# Patient Record
Sex: MALE | Race: WHITE
[De-identification: names, ages, dates, MRNs, and addresses within clinical notes are randomized per-mention and may not be internally consistent; named-entity substitution may affect disease eponyms.]

---

## 2020-07-30 ENCOUNTER — HOSPITAL ENCOUNTER (INPATIENT)
Dept: HOSPITAL 35 - SBH | Age: 75
LOS: 41 days | Discharge: NURSING, REHAB OR LONG-TERM CARE | DRG: 884 | End: 2020-09-09
Attending: PSYCHIATRY & NEUROLOGY | Admitting: PSYCHIATRY & NEUROLOGY
Payer: COMMERCIAL

## 2020-07-30 VITALS — HEIGHT: 74 IN | WEIGHT: 209.5 LBS | BODY MASS INDEX: 26.89 KG/M2

## 2020-07-30 DIAGNOSIS — R45.850: ICD-10-CM

## 2020-07-30 DIAGNOSIS — N18.3: ICD-10-CM

## 2020-07-30 DIAGNOSIS — G47.00: ICD-10-CM

## 2020-07-30 DIAGNOSIS — F01.51: Primary | ICD-10-CM

## 2020-07-30 DIAGNOSIS — G93.40: ICD-10-CM

## 2020-07-30 DIAGNOSIS — F32.9: ICD-10-CM

## 2020-07-30 DIAGNOSIS — G91.8: ICD-10-CM

## 2020-07-30 DIAGNOSIS — N17.0: ICD-10-CM

## 2020-07-30 DIAGNOSIS — E83.42: ICD-10-CM

## 2020-07-30 DIAGNOSIS — F41.9: ICD-10-CM

## 2020-07-30 DIAGNOSIS — N40.0: ICD-10-CM

## 2020-07-30 DIAGNOSIS — E11.42: ICD-10-CM

## 2020-07-30 DIAGNOSIS — F43.10: ICD-10-CM

## 2020-07-30 DIAGNOSIS — I95.9: ICD-10-CM

## 2020-07-30 DIAGNOSIS — E11.22: ICD-10-CM

## 2020-07-30 DIAGNOSIS — D63.1: ICD-10-CM

## 2020-07-30 DIAGNOSIS — I12.9: ICD-10-CM

## 2020-07-30 DIAGNOSIS — Z20.828: ICD-10-CM

## 2020-07-30 DIAGNOSIS — F29: ICD-10-CM

## 2020-07-30 DIAGNOSIS — R45.851: ICD-10-CM

## 2020-07-30 DIAGNOSIS — C91.41: ICD-10-CM

## 2020-07-30 DIAGNOSIS — E78.5: ICD-10-CM

## 2020-07-30 PROCEDURE — 10880: CPT

## 2020-07-30 NOTE — NUR
PT ARRIVED VIA EMS STRETCHER. PT WAS ABLE TO TRANSFER FROM CART TO BED WITH X1
ASSIST. GAVE WATER TO THE PATIENT, PT WAS SITTING ON SIDE OF BED, BED ALARM
ON. GAVE REPORT TO NIGHT SHIFT NURSE. [facility]. PT MENTAL ORIENTATION IS TO SELF AND PLACE AT [facility]. RECENTLY PER [facility]
HOSPITAL RECORD STATED HE USED TO LIVE ALONE, NOW ORIENTED TO SELF.

## 2020-07-31 VITALS — DIASTOLIC BLOOD PRESSURE: 42 MMHG | SYSTOLIC BLOOD PRESSURE: 86 MMHG

## 2020-07-31 VITALS — DIASTOLIC BLOOD PRESSURE: 44 MMHG | SYSTOLIC BLOOD PRESSURE: 75 MMHG

## 2020-07-31 LAB
ALBUMIN SERPL-MCNC: 3.9 G/DL (ref 3.4–5)
ALT SERPL-CCNC: 27 U/L (ref 30–65)
ANION GAP SERPL CALC-SCNC: 12 MMOL/L (ref 7–16)
ANION GAP SERPL CALC-SCNC: 12 MMOL/L (ref 7–16)
AST SERPL-CCNC: 17 U/L (ref 15–37)
BILIRUB SERPL-MCNC: 0.8 MG/DL (ref 0.2–1)
BUN SERPL-MCNC: 23 MG/DL (ref 7–18)
BUN SERPL-MCNC: 24 MG/DL (ref 7–18)
CALCIUM SERPL-MCNC: 8.8 MG/DL (ref 8.5–10.1)
CALCIUM SERPL-MCNC: 9 MG/DL (ref 8.5–10.1)
CHLORIDE SERPL-SCNC: 97 MMOL/L (ref 98–107)
CHLORIDE SERPL-SCNC: 97 MMOL/L (ref 98–107)
CO2 SERPL-SCNC: 24 MMOL/L (ref 21–32)
CO2 SERPL-SCNC: 24 MMOL/L (ref 21–32)
CREAT SERPL-MCNC: 4 MG/DL (ref 0.7–1.3)
CREAT SERPL-MCNC: 4.3 MG/DL (ref 0.7–1.3)
GLUCOSE SERPL-MCNC: 151 MG/DL (ref 74–106)
GLUCOSE SERPL-MCNC: 194 MG/DL (ref 74–106)
LIPASE: 63 U/L (ref 73–393)
POTASSIUM SERPL-SCNC: 3.7 MMOL/L (ref 3.5–5.1)
POTASSIUM SERPL-SCNC: 3.8 MMOL/L (ref 3.5–5.1)
PROT SERPL-MCNC: 7.4 G/DL (ref 6.4–8.2)
SODIUM SERPL-SCNC: 133 MMOL/L (ref 136–145)
SODIUM SERPL-SCNC: 133 MMOL/L (ref 136–145)
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
## 2020-07-31 NOTE — NUR
THIS WRITER CALLED PATIENT'S DPOA (MANDY CHAN) AT 1748HRS ON NUMBER
[phone] TO OBTAIN CONSENT OVER THE PHONE TO TREAT, MESSAGE LEFT ON HER
VOICE MAIL, AWAITNG CALL BACK.

## 2020-07-31 NOTE — NUR
SW spoke with family with Dr Chahal and completed the intake assessment and
TP. Pt needs IV fluids and will likelt d/c to the medical unit.

## 2020-08-01 VITALS — DIASTOLIC BLOOD PRESSURE: 76 MMHG | SYSTOLIC BLOOD PRESSURE: 128 MMHG

## 2020-08-01 VITALS — DIASTOLIC BLOOD PRESSURE: 52 MMHG | SYSTOLIC BLOOD PRESSURE: 105 MMHG

## 2020-08-01 LAB
ALBUMIN SERPL-MCNC: 3.3 G/DL (ref 3.4–5)
ANION GAP SERPL CALC-SCNC: 11 MMOL/L (ref 7–16)
BUN SERPL-MCNC: 26 MG/DL (ref 7–18)
CALCIUM SERPL-MCNC: 8.2 MG/DL (ref 8.5–10.1)
CHLORIDE SERPL-SCNC: 99 MMOL/L (ref 98–107)
CO2 SERPL-SCNC: 23 MMOL/L (ref 21–32)
CREAT SERPL-MCNC: 3.9 MG/DL (ref 0.7–1.3)
GLUCOSE SERPL-MCNC: 141 MG/DL (ref 74–106)
PHOSPHATE SERPL-MCNC: 3.2 MG/DL (ref 2.5–4.9)
POTASSIUM SERPL-SCNC: 3.7 MMOL/L (ref 3.5–5.1)
SODIUM SERPL-SCNC: 133 MMOL/L (ref 136–145)

## 2020-08-01 NOTE — NUR
Assumed care of pt @ 1900. Pt calm et cooperative this shift. Pt had IV fluids
running at beginning of shift that ran until approximately 0030. Order
clarified with APRN on call that pt is to have fluids every four hours until
seen by nephrologist. Fluids started again at 0300 to run at 250mL/hour for
four hours. Took medications shole without difficulty. B/P low this shift but
was notified by prior shift that he has been running low. All other VSWNL.
Health assessment with no abnormalities other than previously noted. Denies
SI/HI at present time. Needs assistance of two people to toilet as pt will
easily just collapse as if legs are giving out beneath him. Would benefit from
the use of a bedside commode rather than going into the bathroom to toilet.
Currently resting in recliner in dayroom with eyes closed. Fluids running
with no signs or symptoms of adverse reaction at present time. Will continue
to monitor per protocol.
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
## 2020-08-01 NOTE — NUR
Assumed care 0700.  Initially pt. was pleasant upon initial meeting.  The
second meeting around 0745 pt. was being seen by Ching Kemp NP-Psych then
the Kidney specialist who asked pt. did he know he had kidney problems and
when did they start.  Pt. became angry, acknowledged he had kidney problems
then started saying inappropriate things to the doctor like I m going to blow
you up.  Pt. was not consoled when nurse tried to explain doctor wanted to
know from pt. perspective the course of his illness.  Doctor left the pt.,
spoke with Dr. Chahal and declined to accept patient on his service.  Dr. Chahal called unit, spoke with nurse and said he would call Dr. Gupat and
said he would discuss situation with Dr. Gupta and ask him to call nurse which
was done.  Dr. Gupta wanted IV discontinued (another bag was hung at
approximately 0800 as night nurse stated in report medical NP said it was to
continue every 4 hours).  IV was DC'd 0820.  Catheter is to be left in until
further notice.
as night
nurse reported that IV was to continue every four hours)

## 2020-08-01 NOTE — NUR
Dr. Gupta DC'D IV 0.9% Sodium Chloride as of 0820.  0845 catheter mycwcl=812
cc, 8667=880 cc, 0595=761wv pale yellow urine.  He eventually had a BM.  It
took two times offering him his AMmeds to get them completed.  For the rest of
the day he was pleasant/polite to rebekahe nurse.  He was also delusional talking
about guns coming into this facility and afraid for the staff.  He had gotten
out of bed unassisted without bed alarm.  Pt. wa requested to get/call for
staff.  He was associating with a female peer who was also watching the news
and talking/commiserating very delusionally about the news. He offers no c/o's
of pain, denies SI/HI/hallucinations.

## 2020-08-02 VITALS — DIASTOLIC BLOOD PRESSURE: 77 MMHG | SYSTOLIC BLOOD PRESSURE: 135 MMHG

## 2020-08-02 VITALS — SYSTOLIC BLOOD PRESSURE: 117 MMHG | DIASTOLIC BLOOD PRESSURE: 79 MMHG

## 2020-08-02 LAB
ALBUMIN SERPL-MCNC: 3.2 G/DL (ref 3.4–5)
ANION GAP SERPL CALC-SCNC: 10 MMOL/L (ref 7–16)
BUN SERPL-MCNC: 20 MG/DL (ref 7–18)
CALCIUM SERPL-MCNC: 8.5 MG/DL (ref 8.5–10.1)
CHLORIDE SERPL-SCNC: 99 MMOL/L (ref 98–107)
CO2 SERPL-SCNC: 26 MMOL/L (ref 21–32)
CREAT SERPL-MCNC: 1.9 MG/DL (ref 0.7–1.3)
GLUCOSE SERPL-MCNC: 158 MG/DL (ref 74–106)
PHOSPHATE SERPL-MCNC: 2.9 MG/DL (ref 2.5–4.9)
POTASSIUM SERPL-SCNC: 3.8 MMOL/L (ref 3.5–5.1)
SODIUM SERPL-SCNC: 135 MMOL/L (ref 136–145)

## 2020-08-02 NOTE — NUR
Assisted to/from toilet, having  small BM.  Denies SI/HI.  RE: hallucinations
is positive for visual and auditory hallucinations.  He believes he gets
messages from the water sprinkler system in his room such that he can predict
the future and is pretty accurate at doing that and well respected for it.  He
is proud of this.  Says he has the same birthdate as Daniel and has followers
and speaks the truth.  He says this in a very calm, assured manner.  He made
the comment (delusionally) that he thought this nurse/writer was the one who
came up with the Covid-19 vaccine.  That comment was corrected with patient
being told that this nurse is a nurse with patients on this floor and is not a
research .

## 2020-08-02 NOTE — NUR
Assumed care of pt @ 1900. Pt calm et cooperative with pleasant demeanor this
shift. Took medications whole without difficulty. Ambulates short distances
with assistance of staff et gait belt. Mclaughlin catheter to dependent drainage
with clear yellow urine of 1100mL this shift @ 0145. VSWNL. Health assessment
with no abnormalities noted at present time. Denies SI/HI @ present time.
Isolated in room most of shift but came out to watch television around 0200
until 0330. Currently resting in bed with eyes closed. Will continue to
monitor per protocol.

## 2020-08-02 NOTE — NUR
Patient did not want to go out to the dayroom because he believes the others
there don't think he knows anything and that depresses him.  He has been med
compliant today.

## 2020-08-03 VITALS — SYSTOLIC BLOOD PRESSURE: 145 MMHG | DIASTOLIC BLOOD PRESSURE: 78 MMHG

## 2020-08-03 VITALS — DIASTOLIC BLOOD PRESSURE: 78 MMHG | SYSTOLIC BLOOD PRESSURE: 145 MMHG

## 2020-08-03 VITALS — SYSTOLIC BLOOD PRESSURE: 120 MMHG | DIASTOLIC BLOOD PRESSURE: 68 MMHG

## 2020-08-03 VITALS — SYSTOLIC BLOOD PRESSURE: 140 MMHG | DIASTOLIC BLOOD PRESSURE: 68 MMHG

## 2020-08-03 LAB
ALBUMIN SERPL-MCNC: 3.3 G/DL (ref 3.4–5)
ANION GAP SERPL CALC-SCNC: 9 MMOL/L (ref 7–16)
BUN SERPL-MCNC: 17 MG/DL (ref 7–18)
CALCIUM SERPL-MCNC: 8.7 MG/DL (ref 8.5–10.1)
CHLORIDE SERPL-SCNC: 100 MMOL/L (ref 98–107)
CO2 SERPL-SCNC: 28 MMOL/L (ref 21–32)
CREAT SERPL-MCNC: 1.5 MG/DL (ref 0.7–1.3)
GLUCOSE SERPL-MCNC: 140 MG/DL (ref 74–106)
PHOSPHATE SERPL-MCNC: 3.1 MG/DL (ref 2.5–4.9)
POTASSIUM SERPL-SCNC: 3.3 MMOL/L (ref 3.5–5.1)
SODIUM SERPL-SCNC: 137 MMOL/L (ref 136–145)

## 2020-08-03 NOTE — NUR
ASSUMED CARE AT 0700 THIS MORNING.  PT. HAS REMAINED IN BED EXCEPT MEAL TIME.
HE WAS COOPERATIVE WITH MEDICATIONS UNTIL 1700 WHEN REFUSES FINGER STICKS FOR
BLOOD SUGAR (EVEN AFTER BEING ASKED X'S 3 BY STAFF AND PCA'S).  TALKED TO DR. THOMPSON WHO INFORMED THIS RN TO NOT PERSUE IT AT THIS TIME.  PT. WAS BLOWING
RASBERRIES AT THIS RN WHEN SHE APPROACHED HIM.  HE IS IRRITABLE AT THIS TIME,
AND UNCOOPERATIVE WITH ALL STAFF.

## 2020-08-03 NOTE — NUR
IVAN spoke with Zafar LOVE at  [phone] [phone], and she spoke with Dr Chahal too. Zafar reported that this pt would be best to return to [facility] and then transition to LTC. [facility] is [facility]missions into [facility] from the [facility]. IVAN then called [facility] and left a VM for Domingo in admissions about this possible readmission.
Then faxed updates.

## 2020-08-04 VITALS — DIASTOLIC BLOOD PRESSURE: 73 MMHG | SYSTOLIC BLOOD PRESSURE: 123 MMHG

## 2020-08-04 VITALS — DIASTOLIC BLOOD PRESSURE: 65 MMHG | SYSTOLIC BLOOD PRESSURE: 113 MMHG

## 2020-08-04 NOTE — NUR
Pt has been accpeted at [facility] clinically and will admit when pt is
ready. Sw will continue to assist with the medi assist to complete the
medicaid application

## 2020-08-04 NOTE — NUR
Kym spoke with panfilo at [facility] and they have denied this pt to return as they
cannot meet his needs. KYM then called Z[facility]ar LOVE at [facility] and left a VM regarding
this decision and requested assistance with placement.

## 2020-08-04 NOTE — NUR
Assumed care of patient this pm shift. Patient calm resting in his room.
Patient is delusional to some degree and states that he no longer needs to
take any medications as he is "beyond medications." Patient refused his pm
medications. Patient dresses in many layers and had on three scrub bottoms
with a pair of blue hossein pants. Patient denies pain. Patient denies hi/si.
Patients affect is blunted. Patient is alert and oriented to self only.
Patient asked RN what city he was in and RN replied [facility].
Patients assessment shows no signs of acute distress. Patient also got on the
floor and when asked why, he stated that he needed to stretch his legs.
Patient was encouraged to not sit on the floor but rather to stretch his legs
in bed. We will continue to monitor per hospital protocol.

## 2020-08-04 NOTE — NUR
Alert to name only, confused speech.  Was very resistant to care this AM
refusing VS, accucheck, assessment and to answering questions.  Talked with
him for approximately 10 min with him being calm but refusing everything
including to get out of bed.  Left room to give meds and he ambulated
independently out of room and sat down at breakfast table where he proceeded
to be calm, cooperative and pleasant taking all meds, compliant with VS.  BG
done late d/t refusal, 3 units insulin given per sliding scale after he was
compliant.  States he had a difficult night d/t it being noisy and he wasn't
comfortable with environment.  Compliant with assessment at approximately
1030.  Breath sounds clear.  Reg HR auscultated.  Color pink with brisk
capillary refill and palpable peripheral pulses.  Independent with voiding.
Bladder scan done at approximately 1130 was 273.  Active bowel sounds over
soft, rounded abdomen.  States he had BM this AM, smear of stool around
rectum.  Ambulates with regular, steady gait independently.  States he is weak
at times.  Rested in bed after group.  Currently eating lunch without s/o
distress.

## 2020-08-05 VITALS — DIASTOLIC BLOOD PRESSURE: 62 MMHG | SYSTOLIC BLOOD PRESSURE: 101 MMHG

## 2020-08-05 VITALS — SYSTOLIC BLOOD PRESSURE: 101 MMHG | DIASTOLIC BLOOD PRESSURE: 62 MMHG

## 2020-08-05 VITALS — DIASTOLIC BLOOD PRESSURE: 54 MMHG | SYSTOLIC BLOOD PRESSURE: 115 MMHG

## 2020-08-05 NOTE — NUR
Assumed care of patient this pm shift. Patient is more aggreable this evening
and in good spirits. Patient took medications as prescribed with thin fluids.
Patient is alert and oriented x2. Patient denies pain. Patient denies hi/si.
Patients affect is blunted. Patient is a falls risk and has on a yellow shirt
under his red shirt. Patient wears multiple layers of clothes and requested
wearing his shoes to bed. Patients assessment shows no signs of acute
distress. Patient does not appear to be having any audible or visual
hallucinations. Patient is continent of bowel and bladder. Patient did not
have any concerns this evening. We will continue to monitor patient per
hospital policy.

## 2020-08-05 NOTE — NUR
ASSUMED CARE AT 0700 THIS MORNING.  PT. WAS SITTING ON THE UNIT IN A CHAIR
WITH HIS BACK TO THE DINNING ROOM AND FACING THE HALLWAY.  HE WAS ASKED TO
MOVE WHEN THE BREAKFAST CART CAME SO THEY COULD PUT THE CART IN THE ROOM.  PT.
WAS COOPERATIVE WITH THIS.  AFTER BREAKFAST, HE AGAIN PULLED HIS CHAIR INTO
THE DOORWAY WITH HIS BACK TO THE DINNING ROOM AND FACING THE CHWODHURY.  HE WAS
PLEASANT AND COOPERATIVE WITH HIS MEDICATIONS AND BLOOD SUGARS.  HE IS EATING
WELL.  HE CONTINUES TO STATE HE IS ALRIGHT BUT IS SUSPICIOUS OF OTHERS.

## 2020-08-06 VITALS — DIASTOLIC BLOOD PRESSURE: 67 MMHG | SYSTOLIC BLOOD PRESSURE: 118 MMHG

## 2020-08-06 VITALS — SYSTOLIC BLOOD PRESSURE: 118 MMHG | DIASTOLIC BLOOD PRESSURE: 67 MMHG

## 2020-08-06 VITALS — SYSTOLIC BLOOD PRESSURE: 109 MMHG | DIASTOLIC BLOOD PRESSURE: 50 MMHG

## 2020-08-06 NOTE — NUR
PT WALKING TO ROOM FOR GROUP AND LEAN AGAINS WALL. PT NEEDED A W/C TO TRANSFER
AT THIS TIME TO GO TO GROUP. PT HAS PERIODS OF WALKING BY SELF, USING WALKER,
AND IN W/C.

## 2020-08-06 NOTE — NUR
Due to a covid outbreak at [facility] they cannot accept any pts for
the next few weeks. They advised that this pt try their sister community in
[place]. Sw sent the referral there.

## 2020-08-06 NOTE — NUR
Pt alert and oriented to self. Forgetful. Confused. Pt was in his bed at time
of assessment. Pt expressed having generalized pain of 5/10. Pt voiced that he
always have pain all over. This he did not voice yesterday. Pt given tylenol
in addition to night meds. Pt voiced having some anxiety and depression. Pt
currently in his bed sleeping. Will continue to monitor.

## 2020-08-06 NOTE — NUR
Subjective   Jimmie Salcedo reports: No big changes since last visit. Patient is performing putty exercises at home daily.    Objective   SCARRING: hypertrophic scarring present with hypersensitivity  OBSERVATION: significant flexion contracture still present  AROM: PIP flexion approximately 12 degrees  PROM: PIP flexion approximately 80 degrees  See Exercise, Manual, and Modality Logs for complete treatment.       Assessment/Plan  Finger flexor contracture still present. Continue with aggressive manual therapy to loosen contracture site.  Progress per Plan of Care           Manual Therapy:    35     mins  56223;  Therapeutic Exercise:    5     mins  34902;     Neuromuscular Amy:        mins  59394;    Therapeutic Activity:          mins  33828;     Gait Training:           mins  [id number];     Ultrasound:     13     mins  08912;   Iontophoresis          mins  85024   Electrical Stimulation:         mins  78361 ( );  Dry Needling          mins self-pay  Fluidotherapy     15     mins [id number]    Timed Treatment:   53   mins   Total Treatment:     68   mins    Aren Randle, PT, CHT  Physical Therapist   PT SITTING IN DINING ROOM AFTER BREAKFAST. PT TOOK MEDS WITHOUT ANY ISSUES. PT
IS WALKING THIS AM WITHOUT WALKER. PT DENIES ANY PAIN. PT IS COMPLIANT AND
CURTIOUS WITH STAFF, SAYING THANK YOU. PT SEEMS TO BE IN GOOD SPIRITS.

## 2020-08-06 NOTE — NUR
SW sent referrals to [facility], [facility], [facility]
and [facility] per [facility] request, as these are the only 4 nursing
homes that are secure and contract with [facility].

## 2020-08-06 NOTE — NUR
Pt was alert and oriented to self only this shift. Thought he was at . Pt
was in his bed at time of assessment. Pt was calm and cooperative. Pt stated
having some anxiety and depression. Pt denies pain. Meds given as ordered. Pt
had a bm this shift. Pt not always complaint in ambulating with a walker. Pt
currently in bed, sleeping. Will continue to monitor.

## 2020-08-07 VITALS — DIASTOLIC BLOOD PRESSURE: 49 MMHG | SYSTOLIC BLOOD PRESSURE: 115 MMHG

## 2020-08-07 VITALS — SYSTOLIC BLOOD PRESSURE: 154 MMHG | DIASTOLIC BLOOD PRESSURE: 68 MMHG

## 2020-08-07 LAB
ALBUMIN SERPL-MCNC: 3.1 G/DL (ref 3.4–5)
ANION GAP SERPL CALC-SCNC: 10 MMOL/L (ref 7–16)
BUN SERPL-MCNC: 11 MG/DL (ref 7–18)
CALCIUM SERPL-MCNC: 8.2 MG/DL (ref 8.5–10.1)
CHLORIDE SERPL-SCNC: 103 MMOL/L (ref 98–107)
CO2 SERPL-SCNC: 26 MMOL/L (ref 21–32)
CREAT SERPL-MCNC: 1.3 MG/DL (ref 0.7–1.3)
GLUCOSE SERPL-MCNC: 197 MG/DL (ref 74–106)
PHOSPHATE SERPL-MCNC: 3 MG/DL (ref 2.5–4.9)
POTASSIUM SERPL-SCNC: 3.8 MMOL/L (ref 3.5–5.1)
SODIUM SERPL-SCNC: 139 MMOL/L (ref 136–145)

## 2020-08-07 NOTE — NUR
PT EATING BREAKFAST IN DINING ROOM. PT USUALLY GOES BACK TO ROOM AFTER EATING.
PT LIKES ALL HIS RAILS UP ON HIS BED. PT DENIES ANY PAIN AT THIS TIME. PT
USING WALKER TO AMBULATE. PT IS A STAND-BY ASSIST. OFFERED FOR PT TO GET A
SHOWER TODAY. PT STATED THAT WOULD BE OK. PT VERY CORGAL AND THANKING STAFF
FOR CARE. NO SIGNS OF AGGITATION.

## 2020-08-07 NOTE — NUR
SW called [facility] and they do not have any male beds, SW called [facility] have no male beds, Sw called [facility] and they denied , [facility] denied earlier, [facility] has a COVID outbreak and
[facility] [place] does not have a secure unit. IVAN called Zafar LOVE at [phone]
[phone] to report that all of the snf denied and she will contact the
LTC coordinator Brandi and Zafar does not have her phone number to give to me.
Zafar also stated that

## 2020-08-07 NOTE — NUR
RT Progress Note- Viral "Nlio" is fairly active in the milieu and is present in
groups with encouragement. He is able to follow along in various music or
trivia games, but easily takes conversation away from the topic with a flight
of various thoughts such as thanking other patients for "saving so many lives"
or presenting conspiracy theories about COVID. Nilo is unsteady on his feet at
times and is assisted to and from groups as to prevent falls. He has not
displayed any aggressive or agitated outbursts during RT interactions.

## 2020-08-08 VITALS — SYSTOLIC BLOOD PRESSURE: 135 MMHG | DIASTOLIC BLOOD PRESSURE: 81 MMHG

## 2020-08-08 VITALS — DIASTOLIC BLOOD PRESSURE: 82 MMHG | SYSTOLIC BLOOD PRESSURE: 157 MMHG

## 2020-08-08 VITALS — DIASTOLIC BLOOD PRESSURE: 81 MMHG | SYSTOLIC BLOOD PRESSURE: 135 MMHG

## 2020-08-08 NOTE — NUR
Assumed care of patient this pm shift. Patient very tired, laying down during
assessment. Patient denies pain. Patient denies hi/si. Patient is alert and
oriented to person, place, and time. Patient takes medications whole with thin
fluids and has been adherent with his scheduled medications this evening.
Patients affect is flat. Patient is a falls risk and is wearing a yellow
shirt. Patient is ambulatory and uses a walker to ambulate. Patients
assessment shows no signs of acute distress. Patient needs encouragement to
shower. We will continue to monitor patient per hospital protocol.

## 2020-08-08 NOTE — NUR
ASSUMED CARE AT 0700 THIS MORNING.  PT. UP FOR MEALS IN THE DINING ROOM.  HE
RESTS IN HIS ROOM BETWEEN MEALS.  HE WAS PLEASANT AND COOPERATIVE WITH TAKING
HIS MEDICATIONS, TAKING HIS INSULIN (3 UNITS), AND WITH ASSESSMENT.  NO NEW
PROBLEMS NOTED OR VOICED.  HE PRESENTS WITH A DEPRESSED MOOD.

## 2020-08-09 VITALS — SYSTOLIC BLOOD PRESSURE: 121 MMHG | DIASTOLIC BLOOD PRESSURE: 56 MMHG

## 2020-08-09 VITALS — DIASTOLIC BLOOD PRESSURE: 87 MMHG | SYSTOLIC BLOOD PRESSURE: 165 MMHG

## 2020-08-09 VITALS — SYSTOLIC BLOOD PRESSURE: 140 MMHG | DIASTOLIC BLOOD PRESSURE: 82 MMHG

## 2020-08-09 LAB
ALBUMIN SERPL-MCNC: 3.1 G/DL (ref 3.4–5)
ALT SERPL-CCNC: 20 U/L (ref 30–65)
ANION GAP SERPL CALC-SCNC: 8 MMOL/L (ref 7–16)
AST SERPL-CCNC: 13 U/L (ref 15–37)
BASOPHILS NFR BLD AUTO: 0.5 % (ref 0–2)
BILIRUB SERPL-MCNC: 0.5 MG/DL (ref 0.2–1)
BUN SERPL-MCNC: 11 MG/DL (ref 7–18)
CALCIUM SERPL-MCNC: 8.9 MG/DL (ref 8.5–10.1)
CHLORIDE SERPL-SCNC: 103 MMOL/L (ref 98–107)
CO2 SERPL-SCNC: 27 MMOL/L (ref 21–32)
CREAT SERPL-MCNC: 1.3 MG/DL (ref 0.7–1.3)
EOSINOPHIL NFR BLD: 1.8 % (ref 0–3)
ERYTHROCYTE [DISTWIDTH] IN BLOOD BY AUTOMATED COUNT: 14.6 % (ref 10.5–14.5)
GLUCOSE SERPL-MCNC: 199 MG/DL (ref 74–106)
GRANULOCYTES NFR BLD MANUAL: 66.5 % (ref 36–66)
HCT VFR BLD CALC: 31.7 % (ref 42–52)
HGB BLD-MCNC: 11.5 GM/DL (ref 14–18)
LYMPHOCYTES NFR BLD AUTO: 22 % (ref 24–44)
MAGNESIUM SERPL-MCNC: 1.3 MG/DL (ref 1.8–2.4)
MCH RBC QN AUTO: 31.1 PG (ref 26–34)
MCHC RBC AUTO-ENTMCNC: 36.4 G/DL (ref 28–37)
MCV RBC: 85.3 FL (ref 80–100)
MONOCYTES NFR BLD: 9.2 % (ref 1–8)
NEUTROPHILS # BLD: 2.5 THOU/UL (ref 1.4–8.2)
PHOSPHATE SERPL-MCNC: 2.8 MG/DL (ref 2.5–4.9)
PLATELET # BLD: 122 THOU/UL (ref 150–400)
POTASSIUM SERPL-SCNC: 3.9 MMOL/L (ref 3.5–5.1)
PROT SERPL-MCNC: 6.7 G/DL (ref 6.4–8.2)
RBC # BLD AUTO: 3.71 MIL/UL (ref 4.5–6)
SODIUM SERPL-SCNC: 138 MMOL/L (ref 136–145)
TSH SERPL-ACNC: 0.49 UIU/ML (ref 0.36–3.74)
VIT B12 SERPL-MCNC: 471 PG/ML (ref 193–986)
WBC # BLD AUTO: 3.8 THOU/UL (ref 4–11)

## 2020-08-09 NOTE — NUR
PATIENT WAS UP IN DINING ROOM TONIGHT FOR HS SNACK AND BACK TO BED. HE TOOK
HIS MEDS WHOLE WITH WATER. HE WAS GIVEN LISPRO INSULIN 4U AT HS FOR GLUCOSE OF
231. PATIENT DENIES PAIN/SI/HI/AVH TONIGHT. HE IS UP WITH ASSIST TO USE THE
RESTROOM. BED ALARM ON AND BED IN LOW POSITION AND LOCKED. ROUTINE ROUNDS TO
ASSESS SAFETY AND STATUS OF PATIENT.

## 2020-08-09 NOTE — NUR
WITHDRAWN TO ROOM MAJORITY OF SHIFT-DID COME OUT FOR MEALS AND SNACK WITH
PROMPTING AND DID READ NEWSPAPER WHEN OFFERED/ BLUNTED AFFECT-DELAYED VERBAL
RESPONSES AND IS SLIGHTLY GUARDED DURING ATTEMPTED 1;1-OFFERS 1-2 WORD
RESPONSES. APPEARS UNKEMPT HAIR UNCOMBED,CLOTHING RUMPLED AS IF HAD BEEN SLEPT
IN-. WHEN ASKED IF HE WOULD LIKE ASSIST WITH ADL'S STATES VAGUELY "MAYBE
LATER" DENIES SI/SH/HI. DESCRIBES MOOD AS "ABOUT HE SAME" GAIT IS STEADY WITH
USE OF ROLLER WALKER. NO NOTED INTERACTION WITH PEER GROUP. BLOOD SUGAR AT
0700-167-3U INSULIN PER SS-110 -6 UNITS PER S.S. AM BP PRIOR TO BP MEDS
165/87-RECHECK AT 1130 BP /82. DENIES COMPLAINTS OF PAIN. COMPLIENT WITH
TAKING MEDICATIONS.

## 2020-08-10 VITALS — SYSTOLIC BLOOD PRESSURE: 113 MMHG | DIASTOLIC BLOOD PRESSURE: 47 MMHG

## 2020-08-10 VITALS — SYSTOLIC BLOOD PRESSURE: 146 MMHG | DIASTOLIC BLOOD PRESSURE: 66 MMHG

## 2020-08-10 LAB
EST. AVERAGE GLUCOSE BLD GHB EST-MCNC: 183 MG/DL
GLYCOHEMOGLOBIN (HGB A1C): 8 % (ref 4.8–5.6)

## 2020-08-10 NOTE — NUR
PATIENT IN ROOM ALL NIGHT. HE IS INDEPENDENT AND UP TO BATHROOM TONIGHT WITH
STAND BY ASSIST. PATIENT LOOKS DISHEVLEVED AND VERY SAD AND FORLORN. HE IS
ISOLATING HIMSELF BUT DOES MAKE IT TO ALL MEALS AND EATING WELL. HE DOES NOT
TALK ALOT AND WHEN ASKED QUESTIONS HE GIVES SHORT ANSWERS. HE DENIES
SI/HI/AVH. HE STATES HIS DAY WAS "OKAY," TODAY. PATIENT HOARDS WATER CUPS IN
HIS ROOM ON BEDSIDE TABLE AND HIS EXTRA CLOTHES AND BELONGINGS ARE KEPT AT THE
BOTTOM OF HIS BED. PATIENT IS UP WITH WALKER. PATIENT DID HAVE TYLENOL 650MG
FOR GENERALIZED PAIN IN BACK. HE FELL ASLEEP QUICKLY WITH HS MEDS AND IS
EYES CLOSED AT THIS TIME. PATIENT IS HYPERVILIGENT WHEN SOMEONE WALKS INTO
ROOM AND AWAKES QUICKLY TO SEE WHO IT IS. PATIENT IS WITHDRAWN. PATIENT HAS
FLAT AFFECT. BED IN LOW POSITON AND BED ALARM IS ON. ROUTINE ROUNDS TO ASSESS
SAFETY AND STATUS OF PATIENT.

## 2020-08-10 NOTE — NUR
WITHDRAWN TO ROOM DURING ANY UNSTRUCTURED TIME THIS AM-DID ATEND AM RT GROUP
AFTER BREAKFAST WITH PROMPTING TO COME BACK TO DAYROOM. FLAT AFFECT-MINIMALLY
VERBAL BUT DENIES COMPLAINTS DURING AM ASSESSMENT. GAIT STEADY WITH USE OF
ROLLER WALKER-DENIES DISTURBANCE OF SLEEP OR APPETITE. DENIES ACUTE ANXIETY OR
A/V HALLUCINATIONS. NO NOTED OR REPORTED PSYCHOSIS. DOES APPEAR MILDLY
HYPERVIGILANT IN MILLEU CONSISTANT WITH PTSD DX.DID APPEAR CONFUSED AT
BREAKFAST ASKING FOR THE DATE AND THEN WHAT TIME OF DAY IT WAS.

## 2020-08-10 NOTE — NUR
Assess for length of stay. 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 Hx
diabetes.  A1C 8% and -274.  Eating 100% of carb control diet order and
calorie level is appropriate.  On diabetic medications.  Also on extra
vitamin/mineral supplementation of vitamin D, B12, hiamine, folic acid.
Low nutrition risk.  Continue to progress to a goal of improved BG management.

## 2020-08-11 VITALS — SYSTOLIC BLOOD PRESSURE: 131 MMHG | DIASTOLIC BLOOD PRESSURE: 65 MMHG

## 2020-08-11 VITALS — DIASTOLIC BLOOD PRESSURE: 71 MMHG | SYSTOLIC BLOOD PRESSURE: 135 MMHG

## 2020-08-11 NOTE — NUR
PATIENT STAYED IN ROOM [facility]. HE IS A/0 X 3 BUT FORGETFUL. HE DOES GET
CONFUSED ABOUT TIME OF DAY SOMETIMES. TONIGHT HE REFUSED TO TAKE HIS HS MEDS.
HE DID ALLOW ME TO GIVE HIM HIS INSULIN  RESULTS OF ACCUCHECK. PATIENT
STATES HE IS NOT TAKING THEM BECAUSE HE IS "DONE FOR." I ASKED HIM IF HE WAS
THINKING ABOUT KILLING OR HARMING HIMSELF. HE SAID HE IS NOT SUICIDAL BUT HE
FEELS THAT EVERYTHING THAT CAN BE DONE FOR HIM IS DONE. HE STATES HE HAS NO
FAMILY, NO FRIENDS, NO GOALS AND DOESN'T FEEL HIS HEALTH WILL IMPROVE. HE JUST
WANTS TO BE LEFT ALONE AND LIVE OUT HIS DAYS TILL HE DIES. HE STATES HE HAS NO
THOUGHTS OF HARMING OR KILLING HIMSELF. HE SAID "I JUST WANT TO GIVE UP."
SPOKE 1:1 WITH PATIENT AND TRIED TO ENCOURAGE HIM WITH POSSIBLE GOALS AND
THINGS HE HAD TO LIVE FOR. PATIENT WAS PLEASANT AND MORE TALKATIVE TONIGHT. HE
HAS CHRONIC PAIN OVER HIS FLANK AREA THAT BOTHERS HIM OFF AND ON. HE REFUSED
ANY PRN MEDS. NOTIFIED DR LOZANO ON CALL OF MED REFUSAL AND PATIENT STATUS. HE
IS BEING MONITORED RAGHU. BED ALARM ON AND BED IN LOW POSITION. PATIENT IS
UP WITH WALKER. CONTINUING TO MONITOR.

## 2020-08-11 NOTE — NUR
Alert and orientated to person, place and situation.  Some delusional thought
stating he was diagnosed with criminal insanity.  Denies SI/HI.  Calm and
cooperative.  States year is 2225.  Initially refused all meds but then agreed
to take all with the exception of vitamins.  Breath sounds clear t/o.  Reg HR
auscultated.  Color pink with brisk capillary refill and palpable peripheral
pulses.  Independent with voiding.  Active bowel sounds over soft, rounded
abdomen.  Ambulates with walker with regular, steady gait.  No concerns today.
BM this am.

## 2020-08-12 VITALS — SYSTOLIC BLOOD PRESSURE: 104 MMHG | DIASTOLIC BLOOD PRESSURE: 64 MMHG

## 2020-08-12 VITALS — DIASTOLIC BLOOD PRESSURE: 70 MMHG | SYSTOLIC BLOOD PRESSURE: 150 MMHG

## 2020-08-12 VITALS — SYSTOLIC BLOOD PRESSURE: 175 MMHG | DIASTOLIC BLOOD PRESSURE: 83 MMHG

## 2020-08-12 VITALS — DIASTOLIC BLOOD PRESSURE: 83 MMHG | SYSTOLIC BLOOD PRESSURE: 175 MMHG

## 2020-08-12 VITALS — DIASTOLIC BLOOD PRESSURE: 64 MMHG | SYSTOLIC BLOOD PRESSURE: 104 MMHG

## 2020-08-12 NOTE — NUR
PATIENT WAS UP, AND OUT ON THE UNIT, SITTING IN DAYROOM WHEN CARE ASSUMED.
PATIENT CHOOSE NOT TO TAKE ALL MORNING MEDIACTIONS EXCEPT INSULIN. "I DON'T
WANT ANY OF THOSE, YOU CAN GIVE ME INSULIN. I AM NOT TAKING THOSE MEDICINE".
PATIENT IS EATING MEALS, AND DRINKING FLUID WELL. PATIENT AMBULATES WITH
ASSIST OF ROLLER WALKER, GAIT SLIGHTLY UNSTEADY.  PATIENT DENIES
SUICIDAL/HOMICIDAL IDEATION. HE DENIES DEPRESSION/ANXIETY. AFFECT IS
SAD/ANGRY/ BLUNTED. MOOD IS DEPRESSED, DR. THOMPSON NOTIFIED THAT PATIENT
REFUSED ALL MORNING MEDICATION, NO NEW ORDER NOTED AT THIS TIME. NO SIGN OF
ACUTE DISTRESS NOTED AT THIS TIME, WILL CONTINUE TO ENCOURAGE
MEDICATION COMPLIANT, AND MONITOR FOR SAFETY.

## 2020-08-12 NOTE — NUR
8-11-20 CARE TRANSFERED 1915 OBSERVED PT LEFT SIDE LYING IN BED RESTING WITH
EYES CLOSED. 2005 PT AAOX3, VSS, RR EVEN AND NONLABORED RA, ASSISTED PT TO
BATHROOM, ON STANDBY, NOTED MODERATE FIRM BROWN BM WITH YELLOW URINE IN TOLET.
PT DENIES ANY PAIN AND SI/SH/HI AT THIS TIME, PT REPORTS HE SOME TIMES
SEE/HEAR THINGS HE IS NOT SURE ABOUT, PT DENIES ANY VAH TODAY. PT REMAINED
CALM AND COOPERATIVE THROUGHING NURSING ASSESSMENT. DURING MEDICATION ADMIN.
PT TOOK INSULIN, BUT REFUSED ALL OTHER MEDICATION, WILL BRING THIS TO THE
ATTENTION OF ONCOMING RN DURING SHIFT REPORT. LATER ASSISTED PT WITH BED
ADJUSTMENT TO COMFORT, AND NOTED DURING NEXT ROUND THAT PT WAS SUPINE RESTING
WITH EYES CLOSED. ZERO ACUTE DISTRES NOTED, PT WILL CONTINUE TO BE MONITOR PER
[facility] PROTOCOL.

## 2020-08-13 VITALS — DIASTOLIC BLOOD PRESSURE: 65 MMHG | SYSTOLIC BLOOD PRESSURE: 121 MMHG

## 2020-08-13 VITALS — DIASTOLIC BLOOD PRESSURE: 60 MMHG | SYSTOLIC BLOOD PRESSURE: 147 MMHG

## 2020-08-13 NOTE — NUR
Assumed care of patient this pm shift. Patient laying down in his bedroom
during assessment. Patient in good spirits, calm and pleasant. Patient is
alert and oriented to person, place, and situation. Patient refused most of
his medications and stated that he did not want to take any pills this
evening. Patient did allow RN to check his glucose and give him 4 units of
insulin. Patients assessment shows no signs of acute distress. Patient is
ambulatory with a walker and has a steady gait. Patient needs encouragement to
use walker. Patients affect is blunted. Patient is a falls risk and has a
yellow shirt on. Patient did not have any comments or concerns at this time.
We will continue to monitor per hospital policy.

## 2020-08-13 NOTE — NUR
PATIENT WAS UP, AND OUT ON THE UNIT, SITTING IN DAY ROOM WHEN CARE ASSUMED.
PATIENT IS EATING MEALS, AND DRINKING FLUID WELL. PATIENT CHOOSE NOT TO TAKE
ALL MORNING MEDICATIONS. " I HAVE THE RIGHT TO REFUSE MEDICINE" DR. THOMPSON
NOTIFIED, BACK-UP HALDOL IM IF REFUSES PO HALDOL ORDERED BY DR. HARVEY. PATIENT
INITIALLY REFUSE TO TAKE IM HALDOL, BUT WITH LOTS OF ENCOURAGMENT FROM THIS
RN, PATIENT AGREED TO TAKE IM INJECTION WITHOUT THE PRESENCE OF SECURITY.
PATIENT'S COUSIN,(MANDY CHAN) CALLED TO SPEAK TO PATIENT, PATIENT CHOOSE NOT
TO TALK TO HER. PATIENT DENIES SUICIDAL/HOMICIDAL IDEATION, HE DENIES
AUDITORY/VISUAL HALLUCINATION. PATIENT RATED BOTH DEPRESSION/ANXIETY 6/10. HE
REPORT HAD BOWEL MOVEMENT THIS MORNING.  AFFECT IS FLAT/BLUNTED, MOOD IS
ANGRY/SAD/DEPRESSED. NO SIGN OF ACUTE DISTRESS NOTED AT THIS TIME, WILL
CONTIUE TO ENCOURAGE MEDICATION COMPLIANT, AND MONITOR FOR SAFETY. General

## 2020-08-14 VITALS — SYSTOLIC BLOOD PRESSURE: 126 MMHG | DIASTOLIC BLOOD PRESSURE: 74 MMHG

## 2020-08-14 NOTE — NUR
PT REFUSING MEDS THIS SHIFT. ACCEPTS INSULIN AND IM HALDOL INJECTION WITH
REFUSAL OF MEDS. PT OUT OF ROOM ONLY FOR MEALS, CONTINUES TO LAY IN BED
THROUGHOUT THE DAY. AMBULATES USING WALKER.

## 2020-08-14 NOTE — NUR
Assumed care of patient this pm shift. Patient is withdrawln and did not come
out of his room. Patient is calm and cooperative. Patient is continent of
bowel and bladder. Patients affect is blunted. Patient takes medications whole
with thin fluids. Patients assessment shows no signs of acute distress.
Patient ambulates with a walker and is a falls risk. Patient is alert and
oriented x3. Patient did not voice any new concerns at this time. We will
continue to monitor per hospital policy.

## 2020-08-15 VITALS — DIASTOLIC BLOOD PRESSURE: 74 MMHG | SYSTOLIC BLOOD PRESSURE: 147 MMHG

## 2020-08-15 VITALS — SYSTOLIC BLOOD PRESSURE: 111 MMHG | DIASTOLIC BLOOD PRESSURE: 52 MMHG

## 2020-08-15 NOTE — NUR
Alert and orientated to person and place but not to time or situation.  States
he has no concerns and his goal is to stay in bed all day.  Denies SI/HI.
When up ambulates with walker with regular, steady gait.  Calm and compliant.
Took meds crushed in yogurt.  Breath sounds clear.  Reg HR auscultated.  Color
pink with brisk capillary refill and palpable peripheral pulses.  Independent
with voiding.  States he had BM this AM.  Active bowel sounds over soft,
rounded abdomen.  Stayed in bed today when not in dining room for meals.

## 2020-08-15 NOTE — NUR
Pt alert and oriented to person and place. Confused. Forgetful. Pt was on his
bed at time of assessment. Pt appeared withdrawn but was calm and cooperative
with assessment. Pt took all his meds as ordered without hesitation. Pt got
insulin per SS. Pt currently in his bed sleeping. Will continue to monitor.

## 2020-08-16 VITALS — SYSTOLIC BLOOD PRESSURE: 102 MMHG | DIASTOLIC BLOOD PRESSURE: 54 MMHG

## 2020-08-16 VITALS — DIASTOLIC BLOOD PRESSURE: 68 MMHG | SYSTOLIC BLOOD PRESSURE: 121 MMHG

## 2020-08-16 NOTE — NUR
ASSUMED CARE AT 0700 TODAY.  PT. IN BED.  BLOOD SUGAR TAKEN AT 0700 AND 1100
AND 1700
TODAY. BOTH TIMES REQUIRING 3 UNITS OF INSULIN.  TOOK HIS MEDICATIONS WITHOUT
DIFFICULTY.  REMAINS IN BED MUCH OF THE TIME BETWEEN MEALS.  HE CONTINUE TO
STATE HE JUST FEELS TIRED ALL THE TIME.  HE HAS BEEN PLEASANT AND COOPERATIVE
WITH STAFF.  NO NEW PROBLEMS NOTED OR VOICED.  EATING WELL.

## 2020-08-16 NOTE — NUR
Pt was in his room at time of assessment. ALert and oriented to person and
place. Pt was pleasant and cooperative with assessments and meds. Denies pain.
Insulin not given as not indicated at bedtime. Pt declined Hs snacks. Stayed
in room afer meds. Pt currently in bed sleeping. Will continue to monitor.

## 2020-08-17 VITALS — SYSTOLIC BLOOD PRESSURE: 121 MMHG | DIASTOLIC BLOOD PRESSURE: 67 MMHG

## 2020-08-17 VITALS — SYSTOLIC BLOOD PRESSURE: 138 MMHG | DIASTOLIC BLOOD PRESSURE: 66 MMHG

## 2020-08-17 NOTE — NUR
PATIENT HAS BEEN IN HIS ROOM ALL NIGHT IN BED. HE DOES CONVERSE WHEN SPOKEN TO
FIRST. PATIENT HAS BEEN CALM AND COOPERATIVE. HE TOOK HIS MEDS WHOLE WITH
WATER. HE STATES THAT HIS PAIN IS SAME HAS IT ALWAYS IS BUT HE REFUSED TO TAKE
ANY MEDS FOR HIS CHRONIC FLANK DISCOMFORT. PATIENT DECLINED HIS TRAZADONE THAT
WAS ORDERED.  PATIENT DENIES SI/HI/AVH. PATIENT IS UP WITH WALKER TO THE
BATHROOM. BED IN LOW POSITION. ROUTINE ROUNDS TO ASSESS FOR SAFETY AND STATUS
OF PATIENT. WILL CONTINUE TO MONITOR.

## 2020-08-17 NOTE — NUR
ASSUMED CARE AT 0700 THIS MORNING.  PT. CONTINUES TO BE SECLUSIVE IN HIS ROOM
AND WHEN ON THE UNIT DOES NOT INTERACT WITH ANYONE UNLESS STAFF APPROACHES
HIM. HE HAS BEEN COOPERATIVE WITH TAKING HIS MEDICATIONS.  HE IS EATING WELL
DURING MEALS.

## 2020-08-17 NOTE — NUR
Followup: remains on [facility] unit.  Continues to eat 100% of carb control diet.
BG improving 171-180 with diabetic meds of metformin, linagliptin, insulin.
Wt up from 208-221 ?.  Will follow trends.  On appropriate calorie level for
carb control diet order.  Low nutrition risk

## 2020-08-18 VITALS — DIASTOLIC BLOOD PRESSURE: 86 MMHG | SYSTOLIC BLOOD PRESSURE: 128 MMHG

## 2020-08-18 VITALS — DIASTOLIC BLOOD PRESSURE: 71 MMHG | SYSTOLIC BLOOD PRESSURE: 121 MMHG

## 2020-08-18 VITALS — SYSTOLIC BLOOD PRESSURE: 128 MMHG | DIASTOLIC BLOOD PRESSURE: 86 MMHG

## 2020-08-18 VITALS — DIASTOLIC BLOOD PRESSURE: 66 MMHG | SYSTOLIC BLOOD PRESSURE: 138 MMHG

## 2020-08-18 NOTE — NUR
PT STILL IN BED AT THIS TIME. PT NEEDED REMINDED ABOUT BREAKFAST. PT
PARTICIPATING IN GROUP. PT ISOLATES SELF IN ROOM AFTER MEALS. PT DENIES ANY
PAIN. PT IS PLEASANT WITH CONVERSATION. PT LIKES HIS 4 RAILS PUT UP ON HIS
BED. PT LUNGS CLEAR. PT DIDN'T HAVE ANY GOAL FOR TODAY.

## 2020-08-19 VITALS — DIASTOLIC BLOOD PRESSURE: 67 MMHG | SYSTOLIC BLOOD PRESSURE: 136 MMHG

## 2020-08-19 VITALS — DIASTOLIC BLOOD PRESSURE: 77 MMHG | SYSTOLIC BLOOD PRESSURE: 139 MMHG

## 2020-08-19 VITALS — SYSTOLIC BLOOD PRESSURE: 139 MMHG | DIASTOLIC BLOOD PRESSURE: 99 MMHG

## 2020-08-19 VITALS — SYSTOLIC BLOOD PRESSURE: 136 MMHG | DIASTOLIC BLOOD PRESSURE: 67 MMHG

## 2020-08-19 LAB
ANION GAP SERPL CALC-SCNC: 11 MMOL/L (ref 7–16)
BUN SERPL-MCNC: 16 MG/DL (ref 7–18)
CALCIUM SERPL-MCNC: 8.3 MG/DL (ref 8.5–10.1)
CHLORIDE SERPL-SCNC: 101 MMOL/L (ref 98–107)
CO2 SERPL-SCNC: 23 MMOL/L (ref 21–32)
CREAT SERPL-MCNC: 1.3 MG/DL (ref 0.7–1.3)
GLUCOSE SERPL-MCNC: 150 MG/DL (ref 74–106)
POTASSIUM SERPL-SCNC: 3.9 MMOL/L (ref 3.5–5.1)
SODIUM SERPL-SCNC: 135 MMOL/L (ref 136–145)

## 2020-08-19 NOTE — NUR
ASSUMED CARE AT 0700 THIS MORNING.  PT. ISOLATIVE IN HIS ROOM.  HE HAS AN
UNKEMPT APPEARANCE.  HE IS COOPERATIVE WITH GETTING HIS BLOOD SUGARS CHECKED
AND TAKING HIS MEDICATIONS.  HE REMAINS IN HIS ROOM EXCEPT FOR MEALS.  HE HAS
TO BE PROMPTED TO COME OUT EVEN THEN FOR MEALS.  HE RETURNS TO HIS ROOM AND
BED ASAP AFTER MEALS.  HE WILL COME OUT WHEN PROMPTED BY STAFF.  HE HAS A FLAT
AFFECT AND MOOD.

## 2020-08-19 NOTE — NUR
Assumed care of patient this pm shift. Patient in his room resting during
assessment. Patient is alert and oriented x3. Patient is medication adherent
and takes medications whole with thin fluids. Patient is in good spirits this
evening. Patient appears to be withdrawln. Patient ambulates with a walker and
is considered a falls risk. Patients assessment shows no signs of acute
distress. Patient did not voice any concerns this evening and denies pain at
this time. Patient is continent of bowel and bladder. Patients affect is
euthymic. We will continue to monitor per hospital policy.

## 2020-08-20 VITALS — DIASTOLIC BLOOD PRESSURE: 64 MMHG | SYSTOLIC BLOOD PRESSURE: 112 MMHG

## 2020-08-20 VITALS — SYSTOLIC BLOOD PRESSURE: 106 MMHG | DIASTOLIC BLOOD PRESSURE: 60 MMHG

## 2020-08-20 NOTE — NUR
KYM spoke with Jennifer and she asked that geovanna Interiano ( sister ) be deisgnated
visitor and that the visit can be 8/26/20 at 2pm. Kym assed this to the white
board and EHR

## 2020-08-20 NOTE — NUR
Assumed care of patient this pm shift. Patient in his bedroom laying down.
Patient is withdrawln and stayed in his room this shift. Patient is alert and
oriented x3. Patient denies pain. Patient denies hi/si. Patient is medication
adherent and takes medications whole with thin fluids. Patient is ambulatory
with a walker and is not considered a falls risk. Patients assessment shows no
signs of acute distress. Patient is calm and cooperative during assessment.
Patient has no concerns or questions at this time. We will continue to monitor
per hospital protocol.

## 2020-08-20 NOTE — NUR
Pt. alert and orientated to person and place.  Calm and cooperative.  Denies
SI/HI.  No s/o distress.  When assessing feet, three pills were found tucked
in his socks.  Pt. states, "I have no idea" where they came from.  Pt. reports
BM this afternoon, stool around anus.  Remains in bed, isolating in room.
States he will get up for dinner.

## 2020-08-20 NOTE — NUR
ASSUMED CARE AT 0700 THIS MORNING.  PT. LAYING IN BED, REFUSED TO GET UP FOR
BREAKFAST.  WHEN ASKING HIM TO GET UP, HE STATED, "I'M DEAD, I CAN'T GET UP".
HE REFUSED MORNING MEDICATIONS.  WILL DISCUSS THIS WITH DR. THOMPSON.  PAGED
DR. THOMPSON.  HE DID NOT LIKE HIS ASSESSMENT THIS MORNING EITHER, BUT DID LAY
STILL FOR IT.  THIS RN TALKED TO HIM ABOUT A SHOWER TODAY AS HE MAY LEAVE IN
THE NEXT WEEK AND WOULD MAKE HIM FEEL MUCH BETTER TO BE CLEAN.  HE REFUSED THE
SHOWER.

## 2020-08-20 NOTE — NUR
JUAN JOSE called [facility] and they still do not have any openings on their secure
unit. JUAN JOSE called [facility] and they might be taking new pt's after
their covid outbreak starting monday. Juan Jose will send new notes on Sunday. Juan Jose
called and left a VM with admissions. Juan Jose followed up with [facility] about placement and left a VM and then fax a referral packet. Juan Jsoe then
called [facility] and asked if they would reconsider this pt as he has
had 3 good weeks- juan jose sent updates notes.

## 2020-08-21 VITALS — DIASTOLIC BLOOD PRESSURE: 50 MMHG | SYSTOLIC BLOOD PRESSURE: 91 MMHG

## 2020-08-21 VITALS — SYSTOLIC BLOOD PRESSURE: 146 MMHG | DIASTOLIC BLOOD PRESSURE: 88 MMHG

## 2020-08-21 NOTE — NUR
No concerns or goals forthe day.  Mostly keeps to himself, does not socialize
with peers.  No agitation or foul language.  Compliant with meds.  Did not c/o
pain.  Layed down on bed for short while.  No tremors noted.

## 2020-08-21 NOTE — NUR
Assumed care of pt @ 2000. Pt refused accucheck for this nurse. Pt refused
health assessment from this nurse. VSWNL. Unable to assess SI/HI due to pt's
refusal to cooperate with this nurse but pt does not demonstrate any signs or
symptoms of emotional distress at present time. Currently resting in bed with
eyes closed. Will continue to monitor per protocol.

## 2020-08-21 NOTE — NUR
Assumed care at 0700.  Compliant with AM meds.  Denies SI/HI/AH/VH. Returned
to dayroom and attended RT group.  No c/o pain.  Pleasant, cooperative.

## 2020-08-22 VITALS — SYSTOLIC BLOOD PRESSURE: 120 MMHG | DIASTOLIC BLOOD PRESSURE: 65 MMHG

## 2020-08-22 VITALS — SYSTOLIC BLOOD PRESSURE: 122 MMHG | DIASTOLIC BLOOD PRESSURE: 74 MMHG

## 2020-08-22 VITALS — DIASTOLIC BLOOD PRESSURE: 74 MMHG | SYSTOLIC BLOOD PRESSURE: 122 MMHG

## 2020-08-22 NOTE — NUR
PT DIDN'T WANT TO EAT BREAKFAST THIS AM. PT RESTING IN BED. PT DENIES ANY
PAIN. PT TOOK MEDS THIS AM AND INSULIN INJ. PT UP WITH WALKER. PT STATED HE
DIDN'T NEED ANYTHING OR HAD ANY ISSUES.

## 2020-08-22 NOTE — NUR
Pt was in his room at time of assessment. Pt was very calm and cooperative
with assessment. Pt denied SI/HI. Pt declined offer to come to the dayroom for
HS snacks. Pt took his meds without issues. Pt ambulates independently to the
bathroom via walker. Pt still in bed sleeping, although was woken up for
morning med and has taken it. Will continue to monitor.

## 2020-08-23 VITALS — DIASTOLIC BLOOD PRESSURE: 74 MMHG | SYSTOLIC BLOOD PRESSURE: 134 MMHG

## 2020-08-23 VITALS — DIASTOLIC BLOOD PRESSURE: 57 MMHG | SYSTOLIC BLOOD PRESSURE: 109 MMHG

## 2020-08-23 NOTE — NUR
Declined to return to dayroom after dinner.  Note Insulin was given later to
make sure he would eat dinner as he refused breakfast and lunch.

## 2020-08-23 NOTE — NUR
AT 1845 dressings changed: cleaned skin tears with NS, vaseline gauze and
regular gauze applied, wrapped with kerlix and self sealing wrap. Continues to
need reminders to stay in chair and not get up without help.  Had formed Bm
earlier in the day.  Took short nap before lunch.

## 2020-08-23 NOTE — NUR
Assumed care 0700.  Declined breakfast and lunch.  Depressed affect, secluded
self to room most of the day.  Was given firm direction/expectation to at
least come out for dinner.  Was given yogurt with medications.  Stated he had
many things on his mind.  While he denied SI/HI/AH/VH.  Meds given later due
to POC.  Cyanocobolamin given late as it was not loaded in [facility] and staff did
not let nurse know when it arrived. Even though fall daly scale score=40,
patient uses walker and then sometimes does not use his walker, has dx. of
Parkinson's disease deemed Fall Risk.
He was initially very secretive about what was on his mind that he did not
want to talk about and was not eating.  Stated no one would care that he eats.
 His concern is that his sister and cousin,  will take over his
financial affairs and life and that is causing him to be anxious and depressed
both rated 6 out of 10 with 10 being the worst.  He declined to further
elaborate.  He was encouraged to talk with his doctor and .

## 2020-08-23 NOTE — NUR
Pt alert and oriented to person and place. Pt was in his room at time of
assessment. Calm and cooperative. Denies pain, SI/HI. Pt took meds without
issues. Pt currently in bed sleeping. Will continue to monitor.

## 2020-08-24 VITALS — SYSTOLIC BLOOD PRESSURE: 107 MMHG | DIASTOLIC BLOOD PRESSURE: 62 MMHG

## 2020-08-24 NOTE — NUR
PT DIDN'T GO OUT TO BREAKFAST. PT LYING IN BED. PT TOOK MEDS WITHOUT ANY
ISSUES. PT UP WITH WALKER. PT NEEDED HELP GETTING UP OUT OF BED THIS AM AND
WAS ABLE TO WALK TO BATHROOM. PT WAS CONT. OF URINE.

## 2020-08-24 NOTE — NUR
Assumed care on 08/23/20 @ 19:15, isolates self into his room.  Came to the
day room for snack time, ate 100% of snack, VSS, Assessment WNL. Took meds
whole with water.  Reports had a BM today. Uses a walker to ambulate. is a
low fall risk with a daly scale of 40. FSBS 130,
no S/S Insulin indicated. Collected x4 pairs of clothing from room and
laundry it. One set of clothes returned to patient's room, the remaining taken
to his locker.  Patient dresses himself in multiple layers if all of clothing
is left in his room.  Bed in low position, bed alarm set for patient safety
during the night when he might be disoriented or unsteady on his feet. Will
continue to monitor as per protocol for patient safety and comfort.

## 2020-08-24 NOTE — NUR
Follouwp: remains on SBH.  Has not eaten breakfast past 3 days but will eat
% of other meals.  BG much improved.  No new wt to assess.  Pt remains
at low nutrition risk

## 2020-08-24 NOTE — NUR
[facility]AN contacted [facility] with the [facility]. No answer. IVAN left [facility].
 
SW team will continue to follow pt during his stay on this unit.

## 2020-08-24 NOTE — NUR
PT OUT IN DINING ROOM FINISHED LUNCH. ANOTHER PT APPROACHED AND WAS GETTING
CLOSE TO HIM, HE SAID GET AWAY FROM MED OR I WILL KNOCK YOUR HEAD OFF. PT ALSO
MAKING GESTERS TO HOUSE KEEPER LIKE HE WAS SLICING HIS THROAT AND FLIPPING HER
OFF. HE SAID TO THIS WRITER THAT SHE WAS A THEIF AND HE SEEN HER TAKE
SOMETHING FROM HIM. HE WAS ALSO CALLING HER A CROCODILE AND SHE WILL FIND OUT
LATER WHAT THAT MEANS. HE SAID SHE NEEDS TO BE IN THE JUNGLE SOMETIME. TOLD PT
THAT SHE IS FROM [facility]OUND [facility] in [place].

## 2020-08-25 VITALS — SYSTOLIC BLOOD PRESSURE: 125 MMHG | DIASTOLIC BLOOD PRESSURE: 64 MMHG

## 2020-08-25 VITALS — SYSTOLIC BLOOD PRESSURE: 101 MMHG | DIASTOLIC BLOOD PRESSURE: 58 MMHG

## 2020-08-25 NOTE — NUR
Assumed care on 08/24/20, @ 20:30, isolating self to room, lying in bed with
door closed and bed in low position, eyes closed and respirations even and
unlabored.  Refused assessessment, VS94/53 62 14 98.3 97% FSBS 134, no S/S
insulin indicated. Patient was surly and uncooperative with staff administring
medication. Made several threatening heavy toned negative comments, using
obscenities to express himself. When staff asked, did he want the head of his
bed laid flat after medication administration, he said "No Damn it", then when
staff left the room, he stated, "You better put the head of that bed down or
I'm going to kill you." HI noted, SI denied. Patient would not answer if he
was having depression, anxiety, or hallucinations.
Laid back down in bed and rested the remainder of the night,
with the bed in low position, eyes closed and respirations even
and unalbored. Will continue to monitor as per protocol for patient safety and
comfort.

## 2020-08-25 NOTE — NUR
PATIENT CARE ASSUMED AT 0700 - PATIENT REFUSED TO GET UP FOR BREAKFAST.
MEDICATION ADMINISTERED AT BEDSIDE. TOLERATED WELL - HAS DIFFICULTY SWALLOWING
PILLS - GIVEN IN APPLESAUCE WHICH MADE IT EASIER. PATIENT AFFECT VERY FLAT
AND MOOD DETACHED AND ISOLATIVE. RESPONSIVE TO QUESTIONS IN ONE WORD ANSWERS -
GOT UP FOR LUNCH AND RETIRED TO HIS ROOM AFTERWARDS. STATED HAD POOR SLEEP
WHEN QUESTIONED BUT WOULD NOT EXPAND ON IT. AMBULATES AROUND WITH WALKER -
SHUFFLES ALONG. NEEDS MONITORING AFTER MEDCIATION PASSES FOR INCREASED
UNSTEADINESS. MAKES NEEDS KNOWN.

## 2020-08-25 NOTE — NUR
IVAN refaxed referrals to [facility], [facility], [facility], and [facility].
 
IVAN contacted [facility] to inquire about openings.  [facility] stated they
have male beds avaliable.  IVAN faxed referral to [facility].

## 2020-08-25 NOTE — NUR
IVAN spoke with Zafar with the [facility] about pt. She said that in her
opinion pt was denied before because of his behaviors. She said the [facility]; at this time they are not accepting admissions from
outside hospitals. However, they may consider it if pt has no other options.
She said that another SW sent referrals to contracted locked facilities but
was denied due to pt's behaviors. She asked that referrals be resent to the
following: [facility], [facility] [place] [facility], [facility],
and [facility] (denied last time due to COVID outbreak). She also
said she will email this SW a listing of facilities and also the name and
number of the director of the [facility].
 
SW team will continue to follow pt during her stay on this unit.

## 2020-08-26 VITALS — SYSTOLIC BLOOD PRESSURE: 122 MMHG | DIASTOLIC BLOOD PRESSURE: 75 MMHG

## 2020-08-26 NOTE — NUR
Assumed care 0700.  Patient declined breakfast and lunch, came out for dinner.
 Had a visitor.  He refused for nurse to assess lungs, heart, abdomen, ankles.
 He answered in the affirmative he felt anxious and depressed but would not
elaborate.  Otherwise has isolated to his room.  Requests bed alarm be left
off on dayshift. Visitor was  his cousin which he seemed to appreciate the
visit.He told RN it was  his personal business as to why he felt
anxious/depressed and would not disclose any information.

## 2020-08-26 NOTE — NUR
Assumed care 0700.  Upone initial meeting patient denied AH/VH.  He adamantly
declined to come out for breakfast.  Replied the reason is personal and I
don't have to tell  you.  He requested AM meds be crushed and put in applesuce
and yogurt. Stated he already had a BM today.  Would not rate Anxiety or
depression, stated, "I'm fine."  Sad affect, no eye contact.  Denies pain.

## 2020-08-26 NOTE — NUR
PT OBSERVED IN BED UPON SHIFT CHANGE. HE DOES NOT APPEAR TO BE IN ANY
DISTRESS. HE SMILED AND ANSWERED MY QUESTIONS. HE TOOK HIS HS MEDS WHOLE IN
APPLE SAUCE.  BLOOD SUGAR @1954 ,TIMED METFORMIN GIVEN. PT JUST WANTED
LIGHTS TURNED OFF SO HE COULD GO BACK TO SLEEP. WILL CONTINUE TO CHECK FOR
SAFETY.

## 2020-08-27 VITALS — SYSTOLIC BLOOD PRESSURE: 94 MMHG | DIASTOLIC BLOOD PRESSURE: 53 MMHG

## 2020-08-27 VITALS — DIASTOLIC BLOOD PRESSURE: 64 MMHG | SYSTOLIC BLOOD PRESSURE: 132 MMHG

## 2020-08-27 NOTE — NUR
IVAN sent Referrals to the following
[facility] [phone]
[facility] [phone] Denied due to funding source.
[facility] [phone]
[facility] [phone]

## 2020-08-27 NOTE — NUR
REFUSED TO COME OUT OF ROOM FOR BREAKFAST-DYSPHORIC,IRRITABLE "I DONT EAT
BREAKFAST"RELUCTANT TO TAKE AM MEDICATIONS BUT DID DO SO WITH PROMPTS. APPEARS
UNKEMPT. ABRUPT VERBAL RESPONSES TO QUESTIONS ADKED AND REFUSES TO ELOBORATE
DESPITE PROMPTD. REFUSED TO COME OUT FOR LUNCH AND REFUSED 1130 INSULIN. GAIT
SLOW/STEADY WITHUSE OF ROLLER WALKER.DENIES PAIN/DISCOMFORT

## 2020-08-27 NOTE — NUR
Pt alert and oriented to person and place. Pt was in his room at time of
assessment. Calm and cooperative. Denies SI/HI. Meds given as ordered. Pt took
meds whole, but had metformin split in half. . Pt did not get any
insulin tonight. Pt currently in his bed, sleeping. Will continue to monitor.

## 2020-08-28 VITALS — DIASTOLIC BLOOD PRESSURE: 66 MMHG | SYSTOLIC BLOOD PRESSURE: 118 MMHG

## 2020-08-28 VITALS — SYSTOLIC BLOOD PRESSURE: 124 MMHG | DIASTOLIC BLOOD PRESSURE: 72 MMHG

## 2020-08-28 NOTE — NUR
RT Progress Note- Viral' participation in milieu is extremely limited as he
isolates to his dark room and lays in bed much of the day. He is gruff during
interaction while in his room. He does however present in groups when reminded
that they are to begin. His mood seems to lighten during groups and he is
accepting of most social interaction. He has remained on topic and not offered
delusional content when sharing during discussions through this week. RT staff
will encourage more milieu participation.

## 2020-08-28 NOTE — NUR
REFUSED 1130 ACCUCHECK AND LUNCH STATES "IM TIRED I NEED TO REST" DID COME OUT
FOR BREAKFAST WITH PROMPTS THIS AM AND STAYED FOR PART OF AM RT GROUP. APPEARS
UNKEMPT-CLOTHING SOILED HAIR UNCOMBED. DYSPHORIC/IRRITABLE-WILL ANSWER YES/NO
TO QUESTIONS ASKED BY STAFF. ABRUPT/GUARDED. NOTED TO HAVE MULTIPLE CLOTHING
ITEMS LYING NEXT TO HIM IN BED WHEN ASKED IF HE WAS COLD AND THAT IS WHY HE
HAD ALL OF CLOTHING IN BED STATES "NO" AND TURNED OVER IN BED AWAY FROM STAFF.
GAIT STEADY WITH USE OF ROLLER WALKER

## 2020-08-28 NOTE — NUR
Assumed pt's care this pm shift. Pt was in his room at time of assessment. Pt
alert and oriented to person and place. Forgetful. Pt was pleasant and
cooperative with assessment. Took meds whole without hesitation. Pt voiced no
pain. Denies anxiety and/or depression. Pt is withdrawn and isolative. Pt
currently in his room, sleeping. Will continue to monitor.

## 2020-08-29 VITALS — SYSTOLIC BLOOD PRESSURE: 118 MMHG | DIASTOLIC BLOOD PRESSURE: 66 MMHG

## 2020-08-29 NOTE — NUR
SW sent referrals to the following
 
[facility] [phone]
[facility] [phone]
[facility] [phone]
[facility] [phone]

## 2020-08-29 NOTE — NUR
PATIENT DID COME OUT TO LUNCH BY W/C. HE LOOKED DISHELVED AND TIRED. HE ATE
ONLY 70% OF HIS MEAL. PATIENT SAT IN DINING ROOM IN A RECLINER FOR A LITTLE
WHILE BEFORE RETURNING TO HIS ROOM TO SLEEP. PATIENT IN BED AT THIS TIME.

## 2020-08-29 NOTE — NUR
Assumed care on 08/24/20, in room in bed at the start of the shift, isolating
self to room and locking room door repeatedly after being asked not to shut
the door completely. Received a phone call, and declined to speak to caller.
Took meds whole or broken in half with water, and was reasonably cooperative
with assessment and medication administration. Has moved self to the second
bed in his room, and does not at this time have a room mate. Sleeping in bed,
with eyes closed and respirations even and unlabored.

## 2020-08-29 NOTE — NUR
PATIENT HAS STAYED IN BED SO FAR TODAY. HE IS A/O X 3 AND FORGETFUL. PATIENT
REFUSED BREAKFAST. HIS GLUCOSE . AT LUNCH HE REFUSED TO GET UP TO EAT
LUNCH. HIS GLUCOSE  AND LISPRO 3U WAS GIVEN. HE DID DRINK DOWN TWO APPLE
JUICES AFTER THE INJECTION AND IS RESTING. HAVE BEEN ENCOURAGING PATIENT TO
GET UP AND BRUSH HIS TEETH. PATIENT REFUSING. HE IS IRRITABLE WHEN ASKED TO
GET UP.  HE DENIES PAIN, SI/HI/AVH. HE AMBULATES WITH WALKER. HE TOOK ALL HIS
MORNING MEDS WHOLE WITH WATER AND WITHOUT COMPLAINT. ROUTINE ROUNDS TO ASSESS
SAFETY AND STATUS OF PATIENT.

## 2020-08-30 VITALS — SYSTOLIC BLOOD PRESSURE: 104 MMHG | DIASTOLIC BLOOD PRESSURE: 66 MMHG

## 2020-08-30 VITALS — SYSTOLIC BLOOD PRESSURE: 116 MMHG | DIASTOLIC BLOOD PRESSURE: 67 MMHG

## 2020-08-30 NOTE — NUR
Assumed care on 08/29/20 @ 19:15, withdraws to room, in bed, awakens to voice,
Cooperated with assessment, VSS, assessment WNL, compliant with medications,
taking meds whole and the large tablets cut in half. Reports BM today, denies
SI/HI/, AH/VH, denies depression and anxiety. Slightly pleasant affect noted,
much more cooperative and less argumentative than yesterday NOC. In bed at
this writing, eyes closed, respirations even and unlabored. Will continue to
monitor as per protocol for patient safety and comfort.

## 2020-08-30 NOTE — NUR
WITHDRAWN TO ROOM THROUGHOUT SHIFT SO FAR-WHEN APPROACHED FOR BLOOD SUGAR THIS
AM REFUSED BUT WAS REQUESTING APPLE JUICE BE BROUGHT TO ROOM-WHEN INFORMED HE
WOULD HAVE TO COME OUT FOR FOOD/FLUIDS STATES "I'M NEVER COMING OUT AGAIN" DID
SWALLOW MEDICACTIONS WHEN PROVIDED BY STAFF BUT IS NON-VERBAL/REFUSING TO
ANSWER QUESTIONS DURING AM PHYSICAL ASSESSMENT.TURNS HEAD AWAY FROM STAFF AND
REFUSES TO ANSWER

## 2020-08-31 VITALS — SYSTOLIC BLOOD PRESSURE: 116 MMHG | DIASTOLIC BLOOD PRESSURE: 73 MMHG

## 2020-08-31 VITALS — DIASTOLIC BLOOD PRESSURE: 73 MMHG | SYSTOLIC BLOOD PRESSURE: 116 MMHG

## 2020-08-31 VITALS — SYSTOLIC BLOOD PRESSURE: 144 MMHG | DIASTOLIC BLOOD PRESSURE: 81 MMHG

## 2020-08-31 NOTE — NUR
Nutrition: pt continues on SBH unit. Continues to refuse breakfast but eats
% of other meals. PCA reported pt disliked breakfast meals. RD visited
with pt to obtain breakfast food preferences however pt reports he has never
been a breakfast eater so no other requests made. Likes lunch/dinner meal
options. Admit weight 208#, Weight on 8/#.  Unsure of accuracy. REC
obtain new weight. On vitamin D, B12 and daily MVI supplementation. Low risk.

## 2020-08-31 NOTE — NUR
ASSUMED CARE AT 0700 THIS MORNING.  PT. WAS IN BED.  HE REFUSED TO COME OUT
FOR BREAKFAST, TAKE HIS MEDICATIONS OR MOVE AT ALL.  HE STATED IT WAS BECAUSE
HE HAS A "DEATH SENTENCE" SO MEDICATIONS ARE NOT NEEDED.  WILL INFORM THE

## 2020-08-31 NOTE — NUR
Assumed care on 08/30/20 @ 19:15, in room in bed at beginning of shift. When
attempting to do assessment, refused interaction saying, leave me alone. When
offering P.O. medication, asked what I had for him, and when given names of
meds and purpose, refused meds, including refusing Gabapentin and Haldol.
Asked about his plans to discharge, and where he is going, he stated, Im going
[place]. Would not discuss mental health assessment. Stated, just
leave me alone and turn the light off when you leave. In bed the entire shift,
would at times turn over and be awake when rounding. Bed in low position, will
continue to monitor as per protocol for patient safety and comfort.

## 2020-08-31 NOTE — NUR
IVAN recieved a VM from Jennifer.  In the VM Jennifer stated she was not able to get a
hold of the [facility] , Zafar.  Jennifer stated they attempted to get a
hold of Zafar's supervisor but was unsucessful.
 
IVAN will continue to follow Pt.

## 2020-09-01 VITALS — SYSTOLIC BLOOD PRESSURE: 103 MMHG | DIASTOLIC BLOOD PRESSURE: 70 MMHG

## 2020-09-01 VITALS — DIASTOLIC BLOOD PRESSURE: 70 MMHG | SYSTOLIC BLOOD PRESSURE: 103 MMHG

## 2020-09-01 VITALS — SYSTOLIC BLOOD PRESSURE: 112 MMHG | DIASTOLIC BLOOD PRESSURE: 68 MMHG

## 2020-09-01 LAB
ANION GAP SERPL CALC-SCNC: 7 MMOL/L (ref 7–16)
BASOPHILS NFR BLD AUTO: 0.6 % (ref 0–2)
BUN SERPL-MCNC: 7 MG/DL (ref 7–18)
CALCIUM SERPL-MCNC: 8.9 MG/DL (ref 8.5–10.1)
CHLORIDE SERPL-SCNC: 100 MMOL/L (ref 98–107)
CO2 SERPL-SCNC: 28 MMOL/L (ref 21–32)
CREAT SERPL-MCNC: 1.2 MG/DL (ref 0.7–1.3)
EOSINOPHIL NFR BLD: 1.1 % (ref 0–3)
ERYTHROCYTE [DISTWIDTH] IN BLOOD BY AUTOMATED COUNT: 16 % (ref 10.5–14.5)
GLUCOSE SERPL-MCNC: 136 MG/DL (ref 74–106)
GRANULOCYTES NFR BLD MANUAL: 59.6 % (ref 36–66)
HCT VFR BLD CALC: 33.8 % (ref 42–52)
HGB BLD-MCNC: 12.3 GM/DL (ref 14–18)
LYMPHOCYTES NFR BLD AUTO: 28.6 % (ref 24–44)
MCH RBC QN AUTO: 31.9 PG (ref 26–34)
MCHC RBC AUTO-ENTMCNC: 36.4 G/DL (ref 28–37)
MCV RBC: 87.8 FL (ref 80–100)
MONOCYTES NFR BLD: 10.1 % (ref 1–8)
NEUTROPHILS # BLD: 2.4 THOU/UL (ref 1.4–8.2)
PLATELET # BLD: 118 THOU/UL (ref 150–400)
POTASSIUM SERPL-SCNC: 3.5 MMOL/L (ref 3.5–5.1)
RBC # BLD AUTO: 3.85 MIL/UL (ref 4.5–6)
SODIUM SERPL-SCNC: 135 MMOL/L (ref 136–145)
WBC # BLD AUTO: 4 THOU/UL (ref 4–11)

## 2020-09-01 NOTE — NUR
KYM recieved a phone call from Nakia Interiano, Pt's sister.  Ms. Interiano inquired about
paper work that was sent over for Dr. Man to complete.  KYM informed the
form will be completed and faxed tomorrow.  Also Mrs. Interiano wanted to schedule
a visit. Kym will need to get permission from Dr. Man concenring the
visitation.
 
Ms. Interiano also reported she has contacted the [facility] SW, [facility], for the past two
days and has not recieved a response.  Ms. Interiano also stated the next step will
be calling her local senator for assistance in the matter.
 
KYM informed [facility] continues to send out referrals.

## 2020-09-01 NOTE — NUR
This writer left a VM for IVAN Rodriguez" at the [phone] -
explained that we have been struggling to find this patient a placement and
need to assistance of the [facility]. Explained that the VA referred this patient to
us and we would appreciate some assistance in knowing some placements that
accept their payment.

## 2020-09-01 NOTE — NUR
8-31-20 CARE TRANSFERED 1900. 1945 PT SUPINE IN BED RESTING WITH EYES CLOSED,
PT EASILY AWAKEN TO VOICE, PT AAOX3, VSS, RR EVEN AND NONLABORED ON RA; PT
DENIES ANY PAIN AND SI/HI. PT WAS CALM AND COOPERATIVE THROUGHOUT NURSING
ASSESSMENT. DURING MEDICATION ADMIN PT REPORT HE WAS NOT TAKING ANY MEDICATION
FOR HE WAS ON DEATHROW AND HE WILL BE EXPIRING. PT WAS REASSURED BUT PT
REFUSED MEDICATION, PT EXCEPTED 3UNITS SQ REG INSULIN FOR A BLD GLUCOSE OF
152. MEDICATION WITH HCP ORDERS EXPLAINED, PT REFUSED ALL PILL FORM
MEDICATION. WHEN 10MG HALDOL IM BROUGHT IN PT REPORTED HE WOULD TAKE THE
PILLS, WASTED 10MG HALDOL IM AND REPULLED MEDICATION. PT HAD NO DIFFCULTIES
TAKEN MEDICATION WHOLE WITH WATER. BED WAS REPOSITION FOR PT COMFORT. ZERO S/S
OF ACUTE DISTRESS NOTED, PT WILL CONTINUE TO BE MONITOR PER [facility] PROTOCOL.

## 2020-09-01 NOTE — NUR
IVAN sent referrals to the following
 
[facility] [address]  [phone]
[facility]  [phone]
[facility] [phone]
[facility] [phone]  Pt denied due to Facility not being able to
                                 meet Pt's needs

## 2020-09-01 NOTE — NUR
assumed care at 0700 this morning.  PT. RESTING IN HIS BED.  HE WAS INFORMED
THAT HE HAS AN ORDER FOR A ROOM LOCK OUT FOR BREAKFAST AND GROUPS.  HE WAS
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
YOU?" "YOU'RE GOING TO [facility], YOU KNOW THAT, RIGHT?" HE CONTINUED THIS, BUT DID
COME OUT FOR BREAKFAST WITHOUT INCIDENCE.  AFTER BREAKFAST, HE TOLD THE PCA
THAT HE HAD TO USE THE BATHROOM.  INSTEAD, HE WENT TO BED.  THIS RN AGAIN WENT
AND TOLD THE PT. HE NEEDED TO BE OUT OF HIS ROOM FOR GROUPS.  HE DID GET UP
AND COME ONTO THE UNIT, WITHOUT SAYING A WORD.  HE DID TAKE HIS MEDICATIONS,
RELUCTANTLY, ONE OR TWO AT A TIME WHILE GLARING AT THIS RN THE ENTIRE TIME.

## 2020-09-02 VITALS — SYSTOLIC BLOOD PRESSURE: 113 MMHG | DIASTOLIC BLOOD PRESSURE: 60 MMHG

## 2020-09-02 VITALS — SYSTOLIC BLOOD PRESSURE: 136 MMHG | DIASTOLIC BLOOD PRESSURE: 76 MMHG

## 2020-09-02 NOTE — NUR
Dr. Dey was requested to  change the SSI to BID as the blood sugars were
changed to.  Order was received later in shift about 1746.  Due to Blood sugar
150 he did not qualify for Insulin, however this was put into eMAR twice as it
did not scan properly the first time.  He is in his room way too much of the
time.  No further complaints.

## 2020-09-02 NOTE — NUR
IVAN faxed referrals to the following:
 
[facility] [phone]
[facility] [phone]
[facility] [phone]
[facility] [phone]

## 2020-09-02 NOTE — NUR
IVAN recieved a call from Mica, [phone], from [facility] on [address]  they are
willing to accept the Pt at their facility.  They can accept him on 9-8-2020.

## 2020-09-02 NOTE — NUR
9-1-20 CARE TRANSFERED 1900 OBSERVED PT SUPINE IN BED RESTING WITH EYES
CLOSED. 1940 PT SITTING IN BED WITH EYES OPEN, AAOX1, VSS, RR EVEN AND
NONLABORED ON RA. PT DENIES ANY PAIN AND SI/HI BUT THEN STATES HE IS ON
DEATHROW, AND JUST DOES NOT WANT TO TALK ANY FURTHER. PT WAS IRRITABLE BUT
REMAINED CALM AND COOPERATIVE. ENCOURAGE PT INTO DINING ROOM FOR EVENING
SNACK, PT % OF APPLESAUCE AND OBSERVED SLOW SHUFFLING GAIT WITH WALKER.
DURING MEDICATION ADMIN PT HAD NO DIFFICULTIES. PT BED WAS REPOSITION FOR
COMFORT. ZERO S/S OF ACUTE DISTRESS NOTED, PT WILL CONTINUE TO BE MONTIOR PER
[facility] PROTOCOL.

## 2020-09-03 VITALS — SYSTOLIC BLOOD PRESSURE: 110 MMHG | DIASTOLIC BLOOD PRESSURE: 68 MMHG

## 2020-09-03 VITALS — DIASTOLIC BLOOD PRESSURE: 68 MMHG | SYSTOLIC BLOOD PRESSURE: 110 MMHG

## 2020-09-03 VITALS — DIASTOLIC BLOOD PRESSURE: 59 MMHG | SYSTOLIC BLOOD PRESSURE: 124 MMHG

## 2020-09-03 NOTE — NUR
9-2-20 CARE TRANSFERRED 1900 OBSERVED PT SITTING SUPINE RESTING IN BED WITH
EYES CLOSED. 1950 PT AAOX3, VSS, RR EVEN AND NONLABORED ON RA. PT DENIES ANY
PAIN OR SI/HI. PT PRESENTED WITHDRAWN, SADNESS BUT WAS CALM AND COOPERATIVE.
PT WAS ENCOURAGED TO GET UP AND COME OUT FOR HS SNACK AND PT %. DURING
MEDICATION ADMIN PT HAD NO DIFFICULTIES. LATER ASSISTED PT IN REPOSITION FOR
COMFORT. ZERO ACUTE DISTRESS NOTED, PT WILL CONTINUE TO BE MONITOR PER [facility]
PROTOCOL.

## 2020-09-03 NOTE — NUR
RT Progress Note- Viral continues to isolate to his room during unscheduled
time. He is ocassionally reluctant to come out of his room for groups, but
overall is in attendance after being reminded that they are going to begin.
During groups his participation varies based on his mood. He often presents
with a flat affect but he brightens when listening to music especially. He
remains overall appropriate with peers and oriented.

## 2020-09-03 NOTE — NUR
@11 amd Kym recieved a call from [facility] , [phone].
Jaelyn informed that Pt's regular SW is out of the office and won't return until
9/8/2020.  Jaelyn inquired about placement for Pt.  KYM informed that Pt was just
accepted to [facility] on [address] with and expected to d/c on 9/9/2020.
KYM inquired about transportation for the Pt.  Jaelyn stated Pt she would check
to see if Pt qualified for transportation service and call me back concerning
the matter.
 
Jaelyn informed that the Pt's sister, Jennifer, has reached out to her concerning
the facility.   KYM informed Jaelyn that referrals were sent out to every
facility on the list  [facility] and this was the only facility
willing to accept the Pt at this time.  Jaelyn understood and expressed she
would convey this information to Jennifer.
 
Jaelyn had no other questions or concerns at this time.  KYM will continue to
follow Pt. Adult General


Chief Complaint


Chief Complaint:  TESTICULAR PAIN OR INJURY





[facility]


HPI





Patient is a 30  year old male presents to the emergency department the three-

day history of right testicular pain. He states that he has not had burning 

with urination but has had yellow discharge from the urethral opening. He has 

right testicular pain. No trauma. No fever. No difficulty with urination.





Review of Systems


Review of Systems





Constitutional: Denies fever or chills []


Eyes: Denies change in visual acuity, redness, or eye pain []


HENT: Denies nasal congestion or sore throat []


Respiratory: Denies cough or shortness of breath []


Cardiovascular: No additional information not addressed in HPI []


GI: Denies abdominal pain, nausea, vomiting, bloody stools or diarrhea []


: Urethral discharge with right testicular pain


Musculoskeletal: Denies back pain or joint pain []


Integument: Denies rash or skin lesions []


Neurologic: Denies headache, focal weakness or sensory changes []


Endocrine: Denies polyuria or polydipsia []





Current Medications


Current Medications





Current Medications








 Medications


  (Trade)  Dose


 Ordered  Sig/Travis  Start Time


 Stop Time Status Last Admin


Dose Admin


 


 Ceftriaxone Sodium


  (Rocephin Im)  250 mg  1X  ONCE  17 19:00


 17 19:01 DC  


 


 


 Ketorolac


 Tromethamine


  (Toradol Im)  60 mg  1X  ONCE  17 19:00


 17 19:01 DC  


 











Allergies


Allergies





Allergies








Coded Allergies Type Severity Reaction Last Updated Verified


 


  No Known Drug Allergies    17 No











Physical Exam


Physical Exam





Constitutional: Well developed, well nourished, no acute distress, non-toxic 

appearance. []


Neck: Normal range of motion, no tenderness, supple, no lymphadenopathy


Cardiovascular:Heart rate regular rhythm, no murmur []


Lungs & Thorax:  Bilateral breath sounds clear to auscultation []


Abdomen: Bowel sounds normal, soft, no tenderness, no masses, no pulsatile 

masses.


: Right testicle slightly erythematous, swollen, diffusely tender purulent 

discharge from urethral opening [] 


Skin: Warm, dry, no erythema, no rash. [] 


Back: No tenderness, no CVA tenderness. [] 


Lymph:  no inguinal lymphadenopathy


Neurologic: Alert and oriented X 3, normal motor function, normal sensory 

function, no focal deficits noted. []





Current Patient Data


Vital Signs





 Vital Signs








  Date Time  Temp Pulse Resp B/P (MAP) Pulse Ox O2 Delivery O2 Flow Rate FiO2


 


17 18:30 97.9 84 18 141/88 (105) 95 Room Air  





 97.9       








Lab Values





 Laboratory Tests








Test


  17


18:55


 


Urine Collection Type Unknown  


 


Urine Color Yellow  


 


Urine Clarity Clear  


 


Urine pH 7.0  


 


Urine Specific Gravity 1.020  


 


Urine Protein


  Negative mg/dL


(NEG-TRACE)


 


Urine Glucose (UA)


  Negative mg/dL


(NEG)


 


Urine Ketones (Stick)


  Negative mg/dL


(NEG)


 


Urine Blood


  Negative (NEG)


 


 


Urine Nitrite


  Negative (NEG)


 


 


Urine Bilirubin


  Negative (NEG)


 


 


Urine Urobilinogen Dipstick


  1.0 mg/dL (0.2


mg/dL)


 


Urine Leukocyte Esterase Small (NEG)  


 


Urine RBC


  Occ /HPF (0-2)


 


 


Urine WBC


  20-40 /HPF


(0-4)


 


Urine Bacteria


  0 /HPF (0-FEW)


 


 


Urine Mucus Mod /LPF  











EKG


EKG


[]





Radiology/Procedures


Radiology/Procedures


[][facility]


 [address] [place] [address]


 [phone]


 


 IMAGING REPORT





 Signed





PATIENT: KALEY ROBERTS ACCOUNT: [id number] MRN#: [id number]


: 1987 LOCATION: ER AGE: 30


SEX: M EXAM DT: 17 ACCESSION#: [id number]


STATUS: REG ER ORD. PHYSICIAN: COREY CASTAÑEDA 


REASON: right testicular pain and swelling


PROCEDURE: TESTICULAR/SCROTUM





EXAM: Testicular sonogram.


 


HISTORY: Right-sided pain.


 


TECHNIQUE: Grayscale and color Doppler sonographic imaging of the testes 


with spectral waveform analysis was performed.


 


COMPARISON: None.


 


FINDINGS: The right testis measures 4.6 x 3.3 x 2.6 cm. The left testis 


measures 5.0 x 2.8 x 2.7 cm. There is normal symmetric blood flow within 


both testes. There is a heterogeneously enlarged and hyperemic right 


epididymis. There are small right greater than left hydroceles.


 


IMPRESSION:


1. Heterogeneously enlarged and hyperemic right epididymis likely due to 


epididymitis.


2. Sonographically unremarkable testes.


3. Small right greater than left hydroceles.


 


Electronically signed by: Kendra Rodriguez MD (2017 7:27 PM) [facility]














DICTATED and SIGNED BY:     KENDRA RODRIGUEZ MD


DATE:     17





CC: NO PCP; NON,STAFF; COREY CASTAÑEDA ~





Course & Med Decision Making


Course & Med Decision Making


Pertinent Labs and Imaging studies reviewed. (See chart for details)





[]





Dragon Disclaimer


Dragon Disclaimer


This electronic medical record was generated, in whole or in part, using a 

voice recognition dictation system.





Departure


Departure


Impression:  


 Primary Impression:  


 Epididymitis, right


Disposition:  01 HOME, SELF-CARE


Condition:  STABLE


Referrals:  


NO PCP (PCP)








[facility]


Patient Instructions:  Epididymitis





Additional Instructions:  


Call for a follow-up appointment with the [facility]. Please call 

tomorrow and schedule an appointment within 5-7 day period.


Scripts


Naproxen (NAPROSYN) 500 Mg Tablet


500 MG PO BID, #20 TAB


   Prov: COREY CASTAÑEDA         17 


Doxycycline Hyclate (DOXYCYCLINE HYCLATE) 100 Mg Capsule


1 CAP PO BID, #20 CAP


   Prov: COREY CASTAÑEDA         17











COREY CASTAÑEDA 2017 18:47

## 2020-09-03 NOTE — NUR
ASSUMED CARE AT 0700 THIS MORNING.  HE CAME ONTO THE UNIT FOR MEALS AND GROUPS
AS PER DR. ARIAS.  HE DID THIS WITHOUT ANY PROBLEMS NOTED.  HE REMAINS QUIET
AND SECLUSIVE WHILE ON THE UNIT.  HE DOES NOT INTERACT WITH ANYONE EXCEPT THIS
RN UPON APROACH ONLY.  HE ATE BREAKFAST AND TOOK HIS MORNING MEDICATIONS
WITHOUT PROBLEMS NOTED.

## 2020-09-03 NOTE — NUR
@1202pm SW recieved a call from Jennifer stating they are willing to accept
the placement at [facility] on [address] for the Pt.  [facility] stated [facility]  and would be contacting [facility] concerning Pt's move.

## 2020-09-04 VITALS — DIASTOLIC BLOOD PRESSURE: 69 MMHG | SYSTOLIC BLOOD PRESSURE: 125 MMHG

## 2020-09-04 NOTE — NUR
When in room patient keeps lights off and door closed most of the way.  He was
incontinent of urine x 1.  He got up from dinner table to get coffee without
walker right after asking writer to get him iced tea.  Did not answer
assessment questions to this writer.  Early AM when RT staff went to invite
him to breakfast he got angry and called her an unacceptable name.  No
reported behavior of the like the rest of the day.  Used the [employer]  type
cream on knees with apparent relief.

## 2020-09-04 NOTE — NUR
Assumed care 0700.  Patient remains withdrawn, isolates to  his room.  Out for
meals, compliant with meds.  Only RT group was held and attended. Says he is a
loner and doesn't like to interact with others.  Angry, sad, depressed affect.

## 2020-09-04 NOTE — NUR
Assumed care on 09/04 @ 19:15, in his room isolating throughout the shift.
Cooperates with physical assessment and compliant with medication
administration. Objected to mental health assessment, bed in low position,
will continue to monitor as per protocol for safety and comfort.

## 2020-09-04 NOTE — NUR
ASSUMED CARE OF PT FROM DAYSHIFT RN, PT RESTING IN BED WITH LIGHTS OFF, ASK IF
PT WANTED HS SNACK PT REFUSED. TOOK PO MEDICATON,THEN AMBULATED TO BATHRROM
HAD BOWELMOVEMENT HEN RETURNED BACK TO BED. PT RESTED WELL THROUGHOUT HOURLY
ROUNDS.

## 2020-09-05 VITALS — DIASTOLIC BLOOD PRESSURE: 75 MMHG | SYSTOLIC BLOOD PRESSURE: 127 MMHG

## 2020-09-05 VITALS — SYSTOLIC BLOOD PRESSURE: 115 MMHG | DIASTOLIC BLOOD PRESSURE: 51 MMHG

## 2020-09-05 NOTE — NUR
PT OUT TO DINING ROOM. PT HAS A VISITOR COMING AT 2 PM. OFFERED PT TO GET A
SHOWER BEFORE VISITOR. PT WAS AGREEMENT. PT HAD LUNCH THEN BACK TO ROOM.

## 2020-09-05 NOTE — NUR
PT RESTING IN BED. PT ALLOWED BLOOD SUGAR TAKEN . PT REFUSED TO EAT
BREAKFAST. PT WANTING DOOR SHUT TO ROOM. NOTICED AN ODOR FROM PT. ASKED ABOUT
BATHING, NO ANSWER. GOT PT FRESH WATER.

## 2020-09-05 NOTE — NUR
Slept soundly throughout the night, for a total of 7.4 hours. In the morning,
patient noted to have strong body odor and strong breath odor. Patient has
been resistant to shower and personal care.

## 2020-09-05 NOTE — NUR
Assumed care on 09/05/20 @ 19:15, in room in bed. Independent with toileting,
however refuses to wash, shower or brush teeth. Hording pillows, hospital
clothes, blankets and personal clothes. Takes meds whole, except for large PO
meds which he likes cut in half. Follows PO meds with water. Withdrawn, but
compliant with medication administration. Would not discuss mental health well
being. Refused physical assessment. VSS. Bed in low position, bed alarm set.
Will continue to monitor as per protocol for safety and comfort.

## 2020-09-06 VITALS — SYSTOLIC BLOOD PRESSURE: 113 MMHG | DIASTOLIC BLOOD PRESSURE: 60 MMHG

## 2020-09-06 VITALS — SYSTOLIC BLOOD PRESSURE: 118 MMHG | DIASTOLIC BLOOD PRESSURE: 66 MMHG

## 2020-09-06 VITALS — DIASTOLIC BLOOD PRESSURE: 66 MMHG | SYSTOLIC BLOOD PRESSURE: 118 MMHG

## 2020-09-06 NOTE — NUR
PT SITTING IN DINING ROOM FOR BREAKFAST. PT DID TAKE AM MEDS. PT WOULD LIKE TO
HAVE MORE FRESH FRUIT. PT UP WITH WALKER. PT RETREATS TO ROOM AFTER MEALS. PT
DID STAY IN GROUPS DURING THE WEEK DUE TO ROOM LOCK OUT. PT LUNGS CLEAR. PT
DENIES ANY PAIN. MENTIONED A SHOWER TODAY.

## 2020-09-07 VITALS — SYSTOLIC BLOOD PRESSURE: 124 MMHG | DIASTOLIC BLOOD PRESSURE: 75 MMHG

## 2020-09-07 VITALS — DIASTOLIC BLOOD PRESSURE: 55 MMHG | SYSTOLIC BLOOD PRESSURE: 126 MMHG

## 2020-09-07 NOTE — NUR
PATIENT IN BED AT CHANGE OF SHIFT. CARE ASSUMED AT 0700 - PATIENT ASSISTED UP.
NEEDS SUPPORT RISING AT FIRST. PATIENT HAD DIFFICULTY SWALLOWING MEDICATIONS.
REQUESTED PILLS CRUSHED IN APPLESAUCE. WAS RELUCTANT AT FIRST TO TAKE HIS
MEDS. MINIMAL PARTICIPATION IN GROUPS. AFFECT FLAT AND MOOD IRRITABLE -
ISOLATES TO ROOM AFTER MEALS AND GROUPS. DOES NOT ENGAGE IN MILIEU OR WITH
PEERS. STATED HAD GOOD SLEEP - DOES NOT ENGAGE IN CONVERSATION  WHEN ADDRESSED

## 2020-09-07 NOTE — NUR
9-6-20 CARE TRANSFERRED 1900 OBSERVED PT SUPINE IN BED RESTING WITH EYES
CLOSED. 1945 PT SUPINE RESTING WITH EYES CLOSED, PT EASILY AWAKEN TO VOICE, PT
AAOX1, VSS, RR EVEN AND NONLABORED ON RA. PT DENIES PAIN AND SI/HI. PT HAS
BEEN CLAM AND COOPERTIVE DURING NURSING [id number]. DURING MEDICATION ADMIN
PT HAD NO DIFFICULTIES. LATER NOTED SUPINE RESTING WITH EYES CLOSED. ZERO S/S
OF ACUTE DISTRESS NOTED, PT WILL CONTINUE TO BE MONITOR PER [facility] PROTOCOL.

## 2020-09-08 VITALS — DIASTOLIC BLOOD PRESSURE: 73 MMHG | SYSTOLIC BLOOD PRESSURE: 120 MMHG

## 2020-09-08 VITALS — DIASTOLIC BLOOD PRESSURE: 72 MMHG | SYSTOLIC BLOOD PRESSURE: 108 MMHG

## 2020-09-08 NOTE — NUR
9-7-20 CARE TRANSFERRED 1900 OBSERVED PT SUPINE IN BED WITH EYES OPEN. 2130 PT
AAOX3, VSS, RR EVEN AND NONLABORED ON RA. PT PRESENTS WITHDRAWN AND FLAT
AFFECT, CALM AND COOPERATIVE. PT ANSWERS QUESTIONS BUT DOES NOT ENGAGE IN
CONVERSATION. PT DENIES ANY PAIN AND SI/HI. ENCOURAGED PT TO COME OUT TO DAY
ROOM FOR HS SNACK AND PT REFUSED. DURING MEDICATION ADMIN PT HAD NO
DIFFICULTIES. LATER NOTED PT LEFT SIDE LYING IN BED WITH EYES CLOSED. ZERO S/S
OF ACUTE DISTRESS NOTED, PT WILL CONTINUE TO BE MONITOR PER [facility] PROTOCOL.

## 2020-09-08 NOTE — NUR
Nutrition followup: Pt continues on [facility] unit eating well, % of meals. Pt
is starting to eat some breakfast meals now. BM 9/6. -161 on carb
controlled diet. Weights stable. Pt voices no nutrition related questions or
concerns. Low nutrition risk.

## 2020-09-08 NOTE — NUR
ASSUMED CARE OF PT AT 0700. PT ALERT AND ORIENTED TIMES FOUR, WITH FLAT
SAD AFFECT. PT DENIES SI/HI/AH/VH. ALSO DENIES PAIN. PT TOLERATES MEDS AND
MEALS. PT ATTENDED GROUPS, BUT HAS LITTLE INTERACTION WITH STAFF OR PEERS.

## 2020-09-09 VITALS — SYSTOLIC BLOOD PRESSURE: 118 MMHG | DIASTOLIC BLOOD PRESSURE: 74 MMHG

## 2020-09-09 VITALS — SYSTOLIC BLOOD PRESSURE: 108 MMHG | DIASTOLIC BLOOD PRESSURE: 72 MMHG

## 2020-09-09 NOTE — NUR
DC ORDERS RECIEVED. AFSHIN VILLEDA CALLED REPORT TO [facility] AND SPOKE TO
REED VILLEDA. PCA ESCORTED PT TO ED ENTRANCE.

## 2020-09-09 NOTE — NUR
ASSUMED CARE ON 09/08/20 @ 19:15, WITHDRAWN TO ROOM, LYING IN BED SUPINE WITH
LIGHTS OUT AND DOOR CLOSED. RESPONDS TO VOICE AND PRESENTS AS ORIENTED X 4,
WITH A FLAT DEPRESSED AFFFECT. ANSWERS ASSESSMENT QUESTIONS WITH ONE WORD, YES
NO ANSWERS OR NODS HEAD. DENIES PAIN, DENIES SI/HI/AH/VH. DENIES ANXIETY,
ENDORSES DEPRESSION. REQUESTED MEDICATIONS TO BE CRUSHED, REPORTING THAT HE
HAD TROUBLE SWOLLOWING HIS MEDICATIONS. FAMILY (SISTER) CALLED AND SPOKE WITH
RN AND PATIENT. INCONTINENT OF URINE AND OFFERED AND ACCEPTED A CLEAN BRIEF.
SLEPT WELL THROUGH THE NIGHT, BED IN LOW POSITION, EYES CLOSED, RESPIRATIONS
EVEN AND UNLABORED. PATIENT ROUNDED ON Q 12 MINUTES FOR SAFETY AND COMFORT.

## 2020-09-10 NOTE — D
[facility]
[address]
[place]   [address]                     DISCHARGE SUMMARY             
_______________________________________________________________________________
 
Name:       ZARA KEATING                  Room #:         [address]B      [facility] [place]  
M.R.#:      [id number]                       Account #:      [id number]  
Admission:  07/30/20    Attend Phys:    Faisal Chahal DO
Discharge:  09/09/20    Date of Birth:  [birth year]  
                                                          Report #: [id number]
                                                                    [id number]   
_______________________________________________________________________________
THIS REPORT FOR:  
 
cc:  AGUSTIN - Family physician unknown
     FAM - Family physician unknown                                       
     Faisal Chahal DO                                        ~
THIS REPORT FOR:   //name//                          
 
CC: Faisal VELEZ unknown
 
DATE OF SERVICE:  09/09/2020
 
 
INPATIENT PSYCHIATRIC DISCHARGE SUMMARY
 
ATTENDING PHYSICIAN:  Faisal Chahal DO
 
MEDICAL CONSULTANT:  Lamont Dyer MD
 
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
pressure hydrocephalus with behavioral disturbance, additional psychosis,
diagnoses of post-traumatic stress disorder, unspecified psychosis,
hypertension, history of chronic kidney disease, creatinine 1.3 on admission,
history of hairy cell leukemia status post remission, insomnia, hyperlipidemia,
diabetes mellitus type 2, peripheral neuropathy, benign prostatic hypertrophy.
Unspecified depressive disorder.  Dr. Chahal, however, did not diagnosis him
of depression.
 
DISCHARGE PLAN:  The patient is discharging to nursing facility in [place], [place], called [facility] on [address].  Psychiatric and medical care to be
provided by that facility.  The patient is incapacitated from medical and
general financial decisions.  His DPOA is sisterNakia.
 
DISCHARGE MEDICATIONS:  Tamsulosin 0.4 mg p.o. daily for BPH, atorvastatin 5 mg
p.o. at bedtime for hyperlipidemia, lisinopril 5 mg p.o. daily for hypertension
and renal protection, gabapentin 400 mg p.o. at bedtime for pain, sertraline 150
mg p.o. daily for depression, haloperidol 20 mg p.o. daily at 0700 hours,
metformin 500 mg p.o. b.i.d., diabetes Tradjenta 5 mg p.o. daily, cyanocobalamin
500-1000mcg
daily, cholecalciferol 3000 International Units p.o. daily, multivitamin 1
tab p.o. daily.
 
LABORATORY DATA:  At the time of discharge.  CBC:  H and H 12.3 and 33.8, white
count 4.0, platelet count 118, roughly similar second most recent CBC. 
  Chemistries showed sodium 135, potassium 3.5, chloride 100,
bicarbonate 20, anion gap 7. BUN 7, creatinine 1.0, estimated GFR 59, glucose
 
 
 
[address]   [address]                     DISCHARGE SUMMARY             
_______________________________________________________________________________
 
Name:       ZARA KEATING                  Room #:         [address]B      [facility] [place]  
[facility]#:      [id number]                       Account #:      [id number]  
Admission:  07/30/20    Attend Phys:    Faisal Chahal DO
Discharge:  09/09/20    Date of Birth:  [birth year]  
                                                          Report #: [id number]
                                                                    [id number]   
_______________________________________________________________________________
136.  Estimated average glucose 183.  Hemoglobin A1c 8.0, calcium 8.9,
phosphorus 2.8, magnesium 1.3, total bilirubin 0.5, AST 13, ALT 20, alkaline
phosphatase 76, total protein 6.7, albumin 3.1, lipase 63 on 07/31/2020.  B12
471 on 08/09/2020.  TSH 0.487.  Covid-19 PCR serology was negative.
 
REASON FOR ADMISSION:  Back at the end of July is as follows:  A 75-year-old
male transferred from [facility].  Apparently, he had been
sent there for confusion, homicidal ideations towards rehab staff down at rehab
at [place].
 
HOSPITAL COURSE:  The patient was admitted to Geriatric Psychiatry Unit. 
Initially, he had acute tubular necrosis.  Fortunately, that resolved after some
IV fluids.  Earlier on he had some hostility towards his nephrologist that was
consulted.  He had only had about 5 or 6 days of problematic behavior.  He went
along with programming at one point.  I did put him on room lockout to make sure
he was attending the therapeutic groups.  All in all, stayed stable, had a
couple of family visits by sister Nakia.  Family had a good handle on the
progressive nature and normal pressure hydrocephalus.
 
CONDITION AT DISCHARGE:  Cautiously optimistic I would say; no SI, no HI.
 
MENTAL STATUS EXAMINATION:
VITAL SIGNS:  At time of discharge, temperature 36.8, pulse 60, respirations 15,
/74, O2 sat 96%.
MUSCULOSKELETAL:  Assisted gait with walker.  Normal station.
MENTAL STATUS EXAMINATION:  This is a well-developed, somewhat unkempt,
disheveled-appearing  male, appearing apparently stated age.  Attention
limited.  Concentration limited.  Speech soft, normal rate.  Thought process
linear and limited.  Thought content, relative poverty.  No psychomotor
agitation.  No psychomotor retardation.  Denied SI or HI.  No auditory, visual,
or tactile hallucinations.  Mood and affect okay, constricted.  Memory not
formally tested.  Insight limited.  Judgment impaired.  Fund of knowledge below
average at this point.
 
PROGNOSIS:  For this patient is guarded to poor given his age, normal pressure
hydrocephalus, and basically those are the reasons.
 
 
 
 
 
 
 
 
  <ELECTRONICALLY SIGNED>
   By: Faisal Chahal DO        
  09/10/20     2127
D: 09/09/20 2204                           _____________________________________
T: 09/09/20 2235                           Faisal Chahal DO          /nt

## 2024-07-09 NOTE — NUR
Assumed care 0700.  Pleasant, med compliant.  Catheter bag with clear pale
yellow urine.  He offers no c/o pain.  He is med compliant taking meds one at
a time whole.  He has been in the recliner.  He has spoken with his DPOA on
the phone.  JOEY has spoken with this nurse and was updated on his behavior of
yesterday and today thus far.  Suggestion from JOEY is to get him talking
about his three dogs:Raphael, Abbie, and Big which are beagles and being
taken care of by a friend.  So far he has been oriented, pleasant today.  He
tends to isolate to his room.
. Carpal Tunnel or Ulnar Nerve Release  Medication  Always take prescribed medication as directed on the bottle or by the provider.   Do not exceed 4000 mg of acetaminophen (Tylenol) per day, remebering that your prescription may contain acetaminophen.    You may also take NSAIDs (ibuprofen, Aleve) for postoperative pain.     Prescription Refills  All prescription refills will be handled during normal business hours, Monday thru Thursday 8 am - 3:30 pm and Friday 8 am - Noon. There will be no refills provided on weekends.  **Refill requests made after hours or on weekends, will not be addressed until the following business day.**  We do not manage medication unless in the immediate postoperative period.    It is our office policy that there is a 24-hour processing and response time for all refill requests.  Please ensure you contact our office in regards to your prescription refill needs in a timely manner to allow sufficient processing and response time by clinical staff per our policy.       Activity  Use pillow(s) to elevate your hand/elbow above the level of your heart for the first 48 hours after surgery to reduce swelling and discomfort.  Continue to elevate for comfort after the first 48 hours.    You may use your hand or arm in a limited fashion.    No heavy lifting until after your post-op appointment.       Diet  You may resume your usual diet.     Dressing/Incision  Keep your dressing and incision(s) clean and dry.  Remove ACE wrap and bandage on the 2nd day AFTER surgery.   You may shower but cover incision with plastic covering or bag.  Do not immerse the wound in water such as doing dishes or soaking in tub.     Call your Doctor if:  Your incision becomes red, swollen, tender or if you have a significant amount of bleeding or discharge or has a bad odor.  You have a fever of 101 degrees or higher.  Pain becomes worse or unbearable and is no longer made tolerable by your prescribed pain medication.   You  are unable to empty your bladder in 8-12 hours after surgery or if you are uncomfortable prior to this time.    You have redness, swelling or unusual pain in of your legs.   You are unable to keep fluids down.  Nausea can be related to anesthesia or pain medicines.    Go to an Emergency Room if you have trouble breathing or chest pain.     Special Instructions  Ice Packs: rotate 20 minutes on and then 20 minutes off for discomfort after surgery.  Do not apply ice pack or cold therapy pad directly on your skin, always keep a clean and dry barrier between your skin and the ice pack or cold therapy pad.  Always monitor your skin for changes, breakdown and/or irritation when ice or cold therapy pad is in use and contact your doctor if any new signs/symptoms occur.     Do not use heat directly over your incision, this can increase the risk of infection.    DO NOT make important decisions for at least 24 hours after your procedure as you will likely still have anesthesia in your system.       Follow Up  You should be seen in office approximately 2 weeks after surgery for removal of sutures and to evaluate your healing.      Home Instruction Sheet  ANESTHESIA for ADULT       What are the side effects?  Side effects depend on the medication used, and may not even be present.    Most Common Sometimes   Irritability  Poor Balance  Sleeping for Several Hours  Drowsiness  Fatigue  Difficulty Concentrating Change in Behavior  Hyperactivity  Nausea (upset stomach)  Gas (flatulence)  Dizziness  Hiccups  Constipation  Blurred Vision     The effects of sedation medicine can last up to 24 hours.  You may be drowsy or irritable for 2 to 8 hours after receiving medicine.  You may need to sleep after leaving the testing area.  Sleeping after sedation will help reduce irritability.  Diet:  Do not give anything to eat or drink until you are fully awake.  Eat a light meal and advance to a normal diet unless instructed differently:   Do not  drink alcohol beverages for the next 24 hours.  Avoid greasy or spicy foods today.  Activity:  You may be dizzy and/or unsteady.  Ask for help walking, using the bathroom, or stairs to protect yourself from injury.  You should not drive a vehicle, operate machinery or power tools for 24 hours because your reflexes may be slow and your vision may be blurry.  Do not sign any legally binding documents or make important decisions for 24 hours after receiving sedation.  Medications:   Unless told otherwise, do not take any non-prescription medications (cold medicine, etc.) for 24 hours, as these medications in combination with sedation medication, can cause increased drowsiness.  If you feel that you need over the counter medication, discuss with your physician.    When Should I Call the Doctor?  Vomiting more than twice.  Extreme irritability or unusual changes in behavior.  Trouble arousing.  Inability to urinate.  Trouble breathing - call 911.    We would like to take this opportunity to thank you. Because your confidence in your caregivers is very important to us, it is our commitment to always treat you and your family with courtesy and respect. Our goal is to always answer any questions or worries or concerns you may have about the care you received.  If you have any suggestions for improvement or worries or concerns please do not hesitate to let us know.                                                                               Want to Say “Thank You” to a Nurse?  The JOVANY Award® was created in memory of OSMAN Nieves by his family to say thank you to bedside nurses who provide an outstanding level of care.    Submit a nomination using any method below.     OR    [email or web address]  Or visit the Resource section   on your [employer] annabelle